# Patient Record
Sex: FEMALE | Race: WHITE | Employment: OTHER | URBAN - METROPOLITAN AREA
[De-identification: names, ages, dates, MRNs, and addresses within clinical notes are randomized per-mention and may not be internally consistent; named-entity substitution may affect disease eponyms.]

---

## 2018-03-22 ENCOUNTER — OFFICE VISIT (OUTPATIENT)
Dept: NEUROLOGY | Age: 83
End: 2018-03-22

## 2018-03-22 VITALS
SYSTOLIC BLOOD PRESSURE: 132 MMHG | HEART RATE: 64 BPM | HEIGHT: 61 IN | WEIGHT: 196 LBS | DIASTOLIC BLOOD PRESSURE: 68 MMHG | OXYGEN SATURATION: 64 % | BODY MASS INDEX: 37 KG/M2 | TEMPERATURE: 98 F | RESPIRATION RATE: 16 BRPM

## 2018-03-22 DIAGNOSIS — R51.9 NEW ONSET OF HEADACHES AFTER AGE 50: Primary | ICD-10-CM

## 2018-03-22 DIAGNOSIS — R51.9 NEW ONSET OF HEADACHES AFTER AGE 50: ICD-10-CM

## 2018-03-22 PROBLEM — E66.01 SEVERE OBESITY (BMI 35.0-39.9) WITH COMORBIDITY (HCC): Status: ACTIVE | Noted: 2018-03-22

## 2018-03-22 RX ORDER — FUROSEMIDE 40 MG/1
TABLET ORAL
Refills: 3 | COMMUNITY
Start: 2018-03-08 | End: 2020-10-20 | Stop reason: DRUGHIGH

## 2018-03-22 RX ORDER — GABAPENTIN 100 MG/1
CAPSULE ORAL
Qty: 90 CAP | Refills: 3 | Status: SHIPPED | OUTPATIENT
Start: 2018-03-22 | End: 2020-10-20

## 2018-03-22 RX ORDER — HYDROCODONE BITARTRATE AND ACETAMINOPHEN 5; 325 MG/1; MG/1
TABLET ORAL
Refills: 0 | COMMUNITY
Start: 2018-03-08 | End: 2020-10-20

## 2018-03-22 RX ORDER — LEVOTHYROXINE SODIUM 88 UG/1
TABLET ORAL
Refills: 1 | COMMUNITY
Start: 2018-01-29 | End: 2020-07-24

## 2018-03-22 RX ORDER — NORTRIPTYLINE HYDROCHLORIDE 10 MG/1
10 CAPSULE ORAL
Qty: 30 CAP | Refills: 6 | Status: SHIPPED | OUTPATIENT
Start: 2018-03-22 | End: 2020-10-20

## 2018-03-22 RX ORDER — TRAZODONE HYDROCHLORIDE 50 MG/1
TABLET ORAL
Refills: 1 | COMMUNITY
Start: 2018-01-19 | End: 2020-10-20

## 2018-03-22 RX ORDER — ATORVASTATIN CALCIUM 20 MG/1
TABLET, FILM COATED ORAL
Refills: 1 | COMMUNITY
Start: 2018-01-19 | End: 2020-07-26 | Stop reason: SDUPTHER

## 2018-03-22 NOTE — MR AVS SNAPSHOT
[address] [address] [address] 
[phone] Patient: Yobani Ramos MRN: [id number] Donal Pac Visit Information Date & Time Provider Department Dept. Phone Encounter #  
 3/22/2018  1:00 PM Kirk Crews MD [facility] [phone] [id number] Follow-up Instructions Return in about 2 months (around 5/22/2018). Your Appointments 5/31/2018  2:20 PM  
Follow Up with Kirk rCews MD  
[address] [facility]) Appt Note: follow up  
 [address] [facility] [address] [address] [address]  
Debbie Escalante [address] [address] [address] Upcoming Health Maintenance Date Due DTaP/Tdap/Td series (1 - Tdap) 8/8/1952 ZOSTER VACCINE AGE 60> 6/8/1991 GLAUCOMA SCREENING Q2Y 8/8/1996 Bone Densitometry (Dexa) Screening 8/8/1996 Pneumococcal 65+ Low/Medium Risk (2 of 2 - PCV13) 1/14/2014 Influenza Age 5 to Adult 8/1/2017 MEDICARE YEARLY EXAM 3/14/2018 Allergies as of 3/22/2018  Review Complete On: 3/22/2018 By: Kirk Crews MD  
 No Known Allergies Current Immunizations  Never Reviewed Name Date Influenza Vaccine PF 1/14/2013  6:56 AM  
 Pneumococcal Polysaccharide (PPSV-23) 1/14/2013  6:52 AM  
  
 Not reviewed this visit You Were Diagnosed With   
  
 Codes Comments New onset of headaches after age 48    -  Primary ICD-10-CM: R46 ICD-9-CM: 714. 0 Vitals BP Pulse Temp Resp Height(growth percentile) Weight(growth percentile) 132/68 (BP 1 Location: Right arm, BP Patient Position: Sitting) 64 98 °F (36.7 °C) (Oral) 16 5' 1\" (1.549 m) 196 lb (88.9 kg) SpO2 BMI OB Status Smoking Status (!) 64% 37.03 kg/m2 Postmenopausal Never Smoker Vitals History BMI and BSA Data  Body Mass Index Body Surface Area  
 37.03 kg/m 2 1.96 m 2  
  
  
 Preferred Pharmacy Pharmacy Name Phone [id number] [address] [facility] [phone] Your Updated Medication List  
  
   
This list is accurate as of 3/22/18  1:39 PM.  Always use your most recent med list.  
  
  
  
  
 aspirin 325 mg tablet Commonly known as:  ASPIRIN Take 325 mg by mouth daily. * atorvastatin 80 mg tablet Commonly known as:  LIPITOR Take 40 mg by mouth daily. No Longer Taking * atorvastatin 20 mg tablet Commonly known as:  LIPITOR  
TAKE 1 TABLET (20 MG) BY ORAL ROUTE ONCE DAILY AT BEDTIME FOR 90 DAYS  
  
 calcium-vitamin D 500-125 mg-unit tablet Commonly known as:  OSCAL Take 1 Tab by mouth two (2) times a day. furosemide 40 mg tablet Commonly known as:  LASIX TAKE 1 TABLET BY ORAL ROUTE DAILY FOR 90 DAYS  
  
 gabapentin 100 mg capsule Commonly known as:  NEURONTIN  
1 po tid prn  Indications: NEUROPATHIC PAIN  
  
 HYDROcodone-acetaminophen 5-325 mg per tablet Commonly known as:  Nico Staff No Longer Taking  
  
 nortriptyline 10 mg capsule Commonly known as:  PAMELOR Take 1 Cap by mouth nightly. oxyCODONE-acetaminophen 5-325 mg per tablet Commonly known as:  PERCOCET Take 1 Tab by mouth every four (4) hours as needed. PRESERVISION LUTEIN PO Take  by mouth. * SYNTHROID 100 mcg tablet Generic drug:  levothyroxine Take 100 mcg by mouth Daily (before breakfast). * levothyroxine 88 mcg tablet Commonly known as:  SYNTHROID  
TAKE 1 TABLET (88 MCG) BY ORAL ROUTE ONCE DAILY FOR 90 DAYS  
  
 traZODone 50 mg tablet Commonly known as:  DESYREL  
TAKE 1-2 TABLETS BY MOUTH AS NEEDED FOR SLEEP FOR 90 DAYS * Notice: This list has 4 medication(s) that are the same as other medications prescribed for you. Read the directions carefully, and ask your doctor or other care provider to review them with you. Prescriptions Sent to Pharmacy Refills nortriptyline (PAMELOR) 10 mg capsule 6 Sig: Take 1 Cap by mouth nightly. Class: Normal  
 Pharmacy: [facility] #[id number] - [place], [address] [facility] Ph #: [phone] Route: Oral  
 gabapentin (NEURONTIN) 100 mg capsule 3 Si po tid prn  Indications: NEUROPATHIC PAIN Class: Normal  
 Pharmacy: [facility] #[id number] - [place], [address] [facility] Ph #: [phone] We Performed the Following C REACTIVE PROTEIN, QT [14229 CPT(R)] Follow-up Instructions Return in about 2 months (around 2018). To-Do List   
 2018 Lab:  SED RATE (ESR)   
  
 2018 Imaging:  MRI BRAIN WO CONT Patient Instructions Patient history reviewed and patient examined. A very different headache story and atypical but exam looks normal otherwise. Would like to check some blood work out and a scan of the head. Will suggest a low-dose preventative drug at night called nortriptyline and need to be patient as a drug builds up in the system. Will issue another drug gabapentin to be used only as needed and she does have a neck pain component. Revisit in the near future. Introducing \[facility]\"" & HEALTH SERVICES! [facility] introduces [employer] patient portal. Now you can access parts of your medical record, email your doctor's office, and request medication refills online. 1. In your internet browser, go to [email or web address] 2. Click on the First Time User? Click Here link in the Sign In box. You will see the New Member Sign Up page. 3. Enter your [employer] Access Code exactly as it appears below. You will not need to use this code after youve completed the sign-up process. If you do not sign up before the expiration date, you must request a new code. · [employer] Access Code: [email or web address] Expires: 2018 12:36 PM 
 
4.  Enter the last four digits of your Social Security Number (xxxx) and Date of Birth (mm/dd/yyyy) as indicated and click Submit. You will be taken to the next sign-up page. 5. Create a [employer]smith ID. This will be your [employer] login ID and cannot be changed, so think of one that is secure and easy to remember. 6. Create a [employer] password. You can change your password at any time. 7. Enter your Password Reset Question and Answer. This can be used at a later time if you forget your password. 8. Enter your e-mail address. You will receive e-mail notification when new information is available in [address] 9. Click Sign Up. You can now view and download portions of your medical record. 10. Click the Download Summary menu link to download a portable copy of your medical information. If you have questions, please visit the Frequently Asked Questions section of the [employer] website. Remember, [employer] is NOT to be used for urgent needs. For medical emergencies, dial 911. Now available from your iPhone and Android! Please provide this summary of care documentation to your next provider. Your primary care clinician is listed as [address]. If you have any questions after today's visit, please call [phone].

## 2018-03-22 NOTE — PROGRESS NOTES
Chief Complaint   Patient presents with    New Patient     Patient presents today as a new patient     Headache     Patient has had a headache since February daily OTC meds are not giving her much relief.     Sinus Pain     Sinus Pain/pressure    Watery Eyes

## 2018-03-22 NOTE — PROGRESS NOTES
Neurology Consult      Subjective:      Aurora Rashid is a 80 y.o. female who comes in with her daughter on first encounter. Apparently had a de michelle headache experience since February that has been continuous since. Says that she really is not a headache person and that includes the majority of her life. Currently describes a migratory sharp pain around her head and has a current bifrontal pressure sensation that is continuous. Occasionally gets blurry vision in the eyes. No recent trauma rash chills or fever. Was treated for possible sinusitis on amoxicillin for 10 days but no real rescue. There is no nausea and vomiting and it is enhanced by nothing. Somewhat diminished with Aleve or Advil but is inconsistent and leaves a lot to be desired. For the last year has had some left ear pressure and itching phenomena. No imaging so far. Denies any stress. Has what sounds like established sleep maintenance insomnia but the daughter points out that sometimes she sleeps while watching TV and does not realize it. No background history of hypertension or stroke history. Has had no localizing attributes of numbness or weakness or involuntary movement or similar preoccupations. Cognition is good. Mood and behavior is baseline. Does have a background neck pain component. Current Outpatient Prescriptions   Medication Sig Dispense Refill    levothyroxine (SYNTHROID) 88 mcg tablet TAKE 1 TABLET (88 MCG) BY ORAL ROUTE ONCE DAILY FOR 90 DAYS  1    furosemide (LASIX) 40 mg tablet TAKE 1 TABLET BY ORAL ROUTE DAILY FOR 90 DAYS  3    traZODone (DESYREL) 50 mg tablet TAKE 1-2 TABLETS BY MOUTH AS NEEDED FOR SLEEP FOR 90 DAYS  1    atorvastatin (LIPITOR) 20 mg tablet TAKE 1 TABLET (20 MG) BY ORAL ROUTE ONCE DAILY AT BEDTIME FOR 90 DAYS  1    VIT C/VIT E AC/LUT/COPPER/ZINC (PRESERVISION LUTEIN PO) Take  by mouth.  nortriptyline (PAMELOR) 10 mg capsule Take 1 Cap by mouth nightly.  30 Cap 6    gabapentin (NEURONTIN) 100 mg capsule 1 po tid prn  Indications: NEUROPATHIC PAIN 90 Cap 3    calcium-vitamin D (OSCAL) 500-125 mg-unit tablet Take 1 Tab by mouth two (2) times a day.  aspirin (ASPIRIN) 325 mg tablet Take 325 mg by mouth daily.  HYDROcodone-acetaminophen (NORCO) 5-325 mg per tablet No Longer Taking  0    oxyCODONE-acetaminophen (PERCOCET) 5-325 mg per tablet Take 1 Tab by mouth every four (4) hours as needed. 10 Tab 0    atorvastatin (LIPITOR) 80 mg tablet Take 40 mg by mouth daily. No Longer Taking      levothyroxine (SYNTHROID) 100 mcg tablet Take 100 mcg by mouth Daily (before breakfast). No Known Allergies  Past Medical History:   Diagnosis Date    Arthritis     Headache     Hearing loss     Hyperlipidemia     Joint pain     Thyroid disease       Past Surgical History:   Procedure Laterality Date    HX ORTHOPAEDIC      knee replacment 1n 1986 & Feb 2015      Social History     Social History    Marital status:      Spouse name: N/A    Number of children: N/A    Years of education: N/A     Occupational History    Not on file. Social History Main Topics    Smoking status: Never Smoker    Smokeless tobacco: Never Used    Alcohol use No    Drug use: No    Sexual activity: Not on file     Other Topics Concern    Not on file     Social History Narrative      History reviewed. No pertinent family history. Visit Vitals    /68 (BP 1 Location: Right arm, BP Patient Position: Sitting)    Pulse 64    Temp 98 °F (36.7 °C) (Oral)    Resp 16    Ht 5' 1\" (1.549 m)    Wt 88.9 kg (196 lb)    SpO2 (!) 64%    BMI 37.03 kg/m2        Review of Systems:   A comprehensive review of systems was negative except for that written in the HPI. Neuro Exam:     Appearance: The patient is well developed, well nourished, provides a coherent history and is in no acute distress. Mental Status: Oriented to time, place and person.  Mood and affect appropriate. Cranial Nerves:   Intact visual fields. Fundi are benign. JENNIFER, EOM's full, no nystagmus, no ptosis. Facial sensation is normal. Corneal reflexes are intact. Facial movement is symmetric. Hearing is normal bilaterally. Palate is midline with normal sternocleidomastoid and trapezius muscles are normal. Tongue is midline. Motor:  5/5 strength in upper and lower proximal and distal muscles. Normal bulk and tone. No fasciculations. Reflexes:   Deep tendon reflexes 2+/4 and symmetrical.   Sensory:   Normal to touch, pinprick and vibration. Gait:  Normal gait. Tremor:   No tremor noted. Cerebellar:  No cerebellar signs present. Neurovascular:  Normal heart sounds and regular rhythm, peripheral pulses intact, and no carotid bruits. Palpation of superficial temporal arteries was objectively normal and subjectively nontender. Assessment:   Atypical headache as described. Would like to suggest some investigation that includes labs for sed rate and CRP although I tend to doubt seriously this is temporal arteritis. Would also like to get a baseline scan of the head with MRI without contrast.  Put nortriptyline 10 mg nightly in place as a preventative drug and use gabapentin 100 mg 3 times daily as needed. Does not have the usual and customary attributes of a migraine and really there is no tension type headache story here unless I missed it? Does have a neck pain component. Plan:   Revisit in about 2 months.   Signed by :  Nico Yo MD

## 2018-03-22 NOTE — PATIENT INSTRUCTIONS
Patient history reviewed and patient examined. A very different headache story and atypical but exam looks normal otherwise. Would like to check some blood work out and a scan of the head. Will suggest a low-dose preventative drug at night called nortriptyline and need to be patient as a drug builds up in the system. Will issue another drug gabapentin to be used only as needed and she does have a neck pain component. Revisit in the near future.

## 2018-03-23 LAB
CRP SERPL-MCNC: 0.7 MG/L (ref 0–4.9)
ERYTHROCYTE [SEDIMENTATION RATE] IN BLOOD BY WESTERGREN METHOD: 11 MM/HR (ref 0–40)

## 2018-03-31 ENCOUNTER — HOSPITAL ENCOUNTER (OUTPATIENT)
Dept: MRI IMAGING | Age: 83
Discharge: HOME | End: 2018-03-31
Attending: SPECIALIST
Payer: MEDICARE

## 2018-03-31 DIAGNOSIS — R51.9 NEW ONSET OF HEADACHES AFTER AGE 50: ICD-10-CM

## 2018-03-31 PROCEDURE — 70551 MRI BRAIN STEM W/O DYE: CPT

## 2018-04-06 ENCOUNTER — TELEPHONE (OUTPATIENT)
Dept: NEUROLOGY | Age: 83
End: 2018-04-06

## 2018-04-06 NOTE — TELEPHONE ENCOUNTER
----- Message from Ana Reyes sent at 4/6/2018  3:18 PM EDT -----  Regarding: Dr. Wilkins Dear  The patient's daughter Nhan Betancourt missed a call from Wisam Galicia and is requesting a call back.  [phone])[phone]

## 2020-07-21 DIAGNOSIS — M54.32 SCIATICA, LEFT SIDE: ICD-10-CM

## 2020-07-22 DIAGNOSIS — E03.9 HYPOTHYROIDISM, UNSPECIFIED TYPE: Primary | ICD-10-CM

## 2020-07-22 DIAGNOSIS — E78.5 HYPERLIPIDEMIA LDL GOAL <100: ICD-10-CM

## 2020-07-24 RX ORDER — LEVOTHYROXINE SODIUM 88 UG/1
TABLET ORAL
Qty: 30 TAB | Refills: 0 | Status: SHIPPED | OUTPATIENT
Start: 2020-07-24 | End: 2020-07-26 | Stop reason: SDUPTHER

## 2020-07-24 NOTE — TELEPHONE ENCOUNTER
Deana bowens needs her, Levthyroxine and Atorvastatin Filled. She said she is 80 yrs. And does not want to come out.

## 2020-07-25 RX ORDER — METHOCARBAMOL 750 MG/1
TABLET, FILM COATED ORAL
Qty: 60 TAB | Refills: 1 | Status: SHIPPED | OUTPATIENT
Start: 2020-07-25 | End: 2020-10-20

## 2020-07-26 RX ORDER — LEVOTHYROXINE SODIUM 88 UG/1
88 TABLET ORAL DAILY
Qty: 30 TAB | Refills: 0 | Status: SHIPPED | OUTPATIENT
Start: 2020-07-26 | End: 2020-09-14

## 2020-07-26 RX ORDER — ATORVASTATIN CALCIUM 20 MG/1
20 TABLET, FILM COATED ORAL EVERY EVENING
Qty: 30 TAB | Refills: 0 | Status: SHIPPED | OUTPATIENT
Start: 2020-07-26 | End: 2020-10-20 | Stop reason: SDUPTHER

## 2020-07-27 NOTE — TELEPHONE ENCOUNTER
Pts last chronic OV 11/2019/  Its been 8 months since last OV. It is time for a OV, it can be virtual again ( she had a since visit in May virtually) . She will need  Labs. /   Refills done 30 day supply

## 2020-08-16 DIAGNOSIS — S76.012A STRAIN OF MUSCLE, FASCIA AND TENDON OF LEFT HIP, INITIAL ENCOUNTER: ICD-10-CM

## 2020-08-16 RX ORDER — DICLOFENAC SODIUM 10 MG/G
GEL TOPICAL
Qty: 200 G | Refills: 2 | Status: SHIPPED | OUTPATIENT
Start: 2020-08-16 | End: 2020-11-10

## 2020-10-10 DIAGNOSIS — E03.9 HYPOTHYROIDISM, UNSPECIFIED TYPE: ICD-10-CM

## 2020-10-10 RX ORDER — LEVOTHYROXINE SODIUM 88 UG/1
88 TABLET ORAL DAILY
Qty: 30 TAB | Refills: 0 | Status: SHIPPED | OUTPATIENT
Start: 2020-10-10 | End: 2020-10-20 | Stop reason: SDUPTHER

## 2020-10-20 ENCOUNTER — OFFICE VISIT (OUTPATIENT)
Dept: PRIMARY CARE CLINIC | Age: 85
End: 2020-10-20
Payer: MEDICARE

## 2020-10-20 VITALS
HEART RATE: 63 BPM | DIASTOLIC BLOOD PRESSURE: 64 MMHG | TEMPERATURE: 97.7 F | HEIGHT: 62 IN | WEIGHT: 196.2 LBS | SYSTOLIC BLOOD PRESSURE: 124 MMHG | OXYGEN SATURATION: 96 % | BODY MASS INDEX: 36.1 KG/M2

## 2020-10-20 DIAGNOSIS — R23.2 HOT FLASHES NOT DUE TO MENOPAUSE: Chronic | ICD-10-CM

## 2020-10-20 DIAGNOSIS — E03.9 HYPOTHYROIDISM, UNSPECIFIED TYPE: ICD-10-CM

## 2020-10-20 DIAGNOSIS — E66.01 SEVERE OBESITY (BMI 35.0-39.9) WITH COMORBIDITY (HCC): ICD-10-CM

## 2020-10-20 DIAGNOSIS — K64.9 HEMORRHOIDS, UNSPECIFIED HEMORRHOID TYPE: Chronic | ICD-10-CM

## 2020-10-20 DIAGNOSIS — E78.5 HYPERLIPIDEMIA LDL GOAL <100: ICD-10-CM

## 2020-10-20 DIAGNOSIS — I44.0 AV BLOCK, 1ST DEGREE: Primary | ICD-10-CM

## 2020-10-20 DIAGNOSIS — R60.0 LOCALIZED EDEMA: Chronic | ICD-10-CM

## 2020-10-20 DIAGNOSIS — R00.2 PALPITATIONS: ICD-10-CM

## 2020-10-20 DIAGNOSIS — S76.012A STRAIN OF MUSCLE, FASCIA AND TENDON OF LEFT HIP, INITIAL ENCOUNTER: Chronic | ICD-10-CM

## 2020-10-20 LAB
BILIRUB UR QL STRIP: NEGATIVE
GLUCOSE UR-MCNC: NEGATIVE MG/DL
KETONES P FAST UR STRIP-MCNC: NEGATIVE MG/DL
PH UR STRIP: 7 [PH] (ref 4.6–8)
PROT UR QL STRIP: NEGATIVE
SP GR UR STRIP: 1.01 (ref 1–1.03)
UA UROBILINOGEN AMB POC: NORMAL (ref 0.2–1)
URINALYSIS CLARITY POC: CLEAR
URINALYSIS COLOR POC: YELLOW
URINE BLOOD POC: NEGATIVE
URINE LEUKOCYTES POC: NEGATIVE
URINE NITRITES POC: NEGATIVE

## 2020-10-20 PROCEDURE — 99214 OFFICE O/P EST MOD 30 MIN: CPT | Performed by: FAMILY MEDICINE

## 2020-10-20 PROCEDURE — 81003 URINALYSIS AUTO W/O SCOPE: CPT | Performed by: FAMILY MEDICINE

## 2020-10-20 RX ORDER — FUROSEMIDE 20 MG/1
TABLET ORAL
Qty: 180 TAB | Refills: 1 | Status: SHIPPED | OUTPATIENT
Start: 2020-10-20 | End: 2020-11-18

## 2020-10-20 RX ORDER — DICLOFENAC SODIUM 10 MG/G
GEL TOPICAL
Qty: 200 G | Refills: 2 | Status: CANCELLED | OUTPATIENT
Start: 2020-10-20

## 2020-10-20 RX ORDER — FUROSEMIDE 20 MG/1
20 TABLET ORAL DAILY
COMMUNITY
End: 2020-10-20 | Stop reason: SDUPTHER

## 2020-10-20 RX ORDER — ATORVASTATIN CALCIUM 20 MG/1
20 TABLET, FILM COATED ORAL EVERY EVENING
Qty: 90 TAB | Refills: 1 | Status: SHIPPED | OUTPATIENT
Start: 2020-10-20 | End: 2021-02-16 | Stop reason: SDUPTHER

## 2020-10-20 RX ORDER — LEVOTHYROXINE SODIUM 88 UG/1
88 TABLET ORAL DAILY
Qty: 90 TAB | Refills: 1 | Status: SHIPPED | OUTPATIENT
Start: 2020-10-20 | End: 2021-02-16 | Stop reason: SDUPTHER

## 2020-10-20 RX ORDER — HYDROCORTISONE 25 MG/G
CREAM TOPICAL 4 TIMES DAILY
Qty: 30 G | Refills: 1 | Status: SHIPPED | OUTPATIENT
Start: 2020-10-20 | End: 2020-11-10

## 2020-10-20 NOTE — PROGRESS NOTES
HISTORY OF PRESENT ILLNESS  Hyperlipidemia:    On atorvastatin 20 mg without incident. No concerns about medication      Hypothyroid :  stable on levothyroxine 88. Legs swollen a lot lately. Has bene on feet a lot. Wake up in am gets tachycardia and not in day. 2 months  And getting often she says. appetite is fine. No vaginal bleed. some left hip leg pain. Doesn't know why. Been days. No trauma recalled. Hemorrhoids acting up to. Bladder prolapse but if takes extra fluid pills for the leg edema and takes 2 sometiems. She says that's fine. Rajendra Gutiérrez is a 80 y.o. female. 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    AV block, 1st degree     Back problem     Constipation 11/18/2020    Edema of lower extremity 11/18/2020    Headache     Hearing loss     Herpes     Hyperlipidemia     Hypertension     Incomplete rectal prolapse 11/18/2020    Joint pain     Osteoarthritis of both knees 11/18/2020    Sleep disorder     Insomnia    Thyroid disease      Social History     Tobacco Use    Smoking status: Never Smoker    Smokeless tobacco: Never Used   Substance Use Topics    Alcohol use: No    Drug use: No       Family History   Problem Relation Age of Onset    Heart Disease Mother     Heart Disease Father        Review of Systems   Constitutional: Positive for malaise/fatigue. Negative for chills, fever and weight loss. HENT: Negative for congestion and sore throat. Eyes: Negative for blurred vision and pain. Respiratory: Negative for cough, sputum production and shortness of breath. Cardiovascular: Positive for leg swelling. Negative for chest pain, palpitations, orthopnea, claudication and PND. Gastrointestinal: Negative for abdominal pain, blood in stool, constipation, diarrhea and nausea. Genitourinary: Negative for dysuria and frequency. Musculoskeletal: Positive for joint pain.  Negative for falls.   Skin: Negative for rash. Neurological: Negative for dizziness, tremors, speech change, focal weakness, loss of consciousness and headaches. Endo/Heme/Allergies: Negative for polydipsia. Psychiatric/Behavioral: Negative for depression. The patient is not nervous/anxious. Visit Vitals  /64 (BP 1 Location: Right arm, BP Patient Position: At rest)   Pulse 63   Temp 97.7 °F (36.5 °C) (Temporal)   Ht 5' 2\" (1.575 m)   Wt 196 lb 3.2 oz (89 kg)   SpO2 96%   BMI 35.89 kg/m²         Physical Exam  Vitals signs and nursing note reviewed. Exam conducted with a chaperone present. Constitutional:       General: She is not in acute distress. Appearance: Normal appearance. She is obese. She is not ill-appearing. HENT:      Head: Normocephalic and atraumatic. Right Ear: Tympanic membrane normal.      Left Ear: Tympanic membrane normal.      Mouth/Throat:      Mouth: Mucous membranes are moist.      Pharynx: Posterior oropharyngeal erythema present. No oropharyngeal exudate. Eyes:      General: No scleral icterus. Right eye: No discharge. Left eye: No discharge. Extraocular Movements: Extraocular movements intact. Conjunctiva/sclera: Conjunctivae normal.      Pupils: Pupils are equal, round, and reactive to light. Cardiovascular:      Rate and Rhythm: Normal rate and regular rhythm. Pulses: Normal pulses. Heart sounds: Normal heart sounds. Pulmonary:      Effort: Pulmonary effort is normal. No respiratory distress. Breath sounds: Normal breath sounds. No wheezing or rales. Musculoskeletal:         General: Tenderness (left ant hip) present. Right lower leg: Edema present. Left lower leg: Edema present. Lymphadenopathy:      Cervical: No cervical adenopathy. Skin:     General: Skin is warm. Capillary Refill: Capillary refill takes less than 2 seconds. Coloration: Skin is not jaundiced. Findings: No erythema or rash. Neurological:      General: No focal deficit present. Mental Status: She is alert and oriented to person, place, and time. Mental status is at baseline. Cranial Nerves: No cranial nerve deficit. Deep Tendon Reflexes: Reflexes normal.   Psychiatric:         Attention and Perception: She is inattentive. Mood and Affect: Mood is anxious. Speech: Speech is tangential.         Thought Content: Thought content normal.         Cognition and Memory: Cognition normal.         Judgment: Judgment is impulsive. Comments: Monologue. Makes it hard to disseminate plan           ASSESSMENT and PLAN  Diagnoses and all orders for this visit:    1. AV block, 1st degree  -     AMB POC EKG ROUTINE W/ 12 LEADS, INTER & REP  -     MAGNESIUM    2. Strain of muscle, fascia and tendon of left hip, initial encounter  Comments:  Ice and stretch. Could be hip arthritis,    3. Hypothyroidism, unspecified type  -     levothyroxine (SYNTHROID) 88 mcg tablet; Take 1 Tab by mouth daily. Indications: a condition with low thyroid hormone levels  -     TSH 3RD GENERATION  -     T4, FREE    4. Hyperlipidemia LDL goal <100  -     atorvastatin (LIPITOR) 20 mg tablet; Take 1 Tab by mouth every evening. Indications: high cholesterol and high triglycerides  -     METABOLIC PANEL, COMPREHENSIVE  -     LIPID PANEL  -     AMB POC EKG ROUTINE W/ 12 LEADS, INTER & REP    5. Localized edema  Comments:  chronic    6. Hemorrhoids, unspecified hemorrhoid type  Comments:  Add cream, sitz bath, salty. . See GI if persists  Orders:  -     CBC WITH AUTOMATED DIFF    7. Hot flashes not due to menopause  Comments:  I do not know what to make of it . We will check thyroid. Nonspecific history  Orders:  -     AMB POC URINALYSIS DIP STICK AUTO W/O MICRO  -     CBC WITH AUTOMATED DIFF    8. Severe obesity (BMI 35.0-39. 9) with comorbidity ([address]    9. Palpitations  -     MAGNESIUM         Orders Placed This Encounter    METABOLIC PANEL, COMPREHENSIVE    TSH 3RD GENERATION    T4, FREE    LIPID PANEL    CBC WITH AUTOMATED DIFF    MAGNESIUM    AMB POC URINALYSIS DIP STICK AUTO W/O MICRO    AMB POC EKG ROUTINE W/ 12 LEADS, INTER & REP    DISCONTD: furosemide (LASIX) 20 mg tablet    levothyroxine (SYNTHROID) 88 mcg tablet    DISCONTD: furosemide (LASIX) 20 mg tablet    atorvastatin (LIPITOR) 20 mg tablet    DISCONTD: hydrocortisone (Proctosol HC) 2.5 % rectal cream     Follow-up and Dispositions    · Return in about 3 months (around 1/20/2021), or if symptoms worsen or fail to improve, for Chronic office visit.

## 2020-10-21 LAB
ALBUMIN SERPL-MCNC: 4.1 G/DL (ref 3.6–4.6)
ALBUMIN/GLOB SERPL: 1.4 {RATIO} (ref 1.2–2.2)
ALP SERPL-CCNC: 70 IU/L (ref 39–117)
ALT SERPL-CCNC: 12 IU/L (ref 0–32)
AST SERPL-CCNC: 19 IU/L (ref 0–40)
BASOPHILS # BLD AUTO: 0.1 X10E3/UL (ref 0–0.2)
BASOPHILS NFR BLD AUTO: 1 %
BILIRUB SERPL-MCNC: 0.5 MG/DL (ref 0–1.2)
BUN SERPL-MCNC: 24 MG/DL (ref 8–27)
BUN/CREAT SERPL: 16 (ref 12–28)
CALCIUM SERPL-MCNC: 9 MG/DL (ref 8.7–10.3)
CHLORIDE SERPL-SCNC: 101 MMOL/L (ref 96–106)
CHOLEST SERPL-MCNC: 164 MG/DL (ref 100–199)
CO2 SERPL-SCNC: 26 MMOL/L (ref 20–29)
CREAT SERPL-MCNC: 1.53 MG/DL (ref 0.57–1)
EOSINOPHIL # BLD AUTO: 0.2 X10E3/UL (ref 0–0.4)
EOSINOPHIL NFR BLD AUTO: 4 %
ERYTHROCYTE [DISTWIDTH] IN BLOOD BY AUTOMATED COUNT: 12.3 % (ref 11.7–15.4)
GLOBULIN SER CALC-MCNC: 3 G/DL (ref 1.5–4.5)
GLUCOSE SERPL-MCNC: 100 MG/DL (ref 65–99)
HCT VFR BLD AUTO: 35.6 % (ref 34–46.6)
HDLC SERPL-MCNC: 43 MG/DL
HGB BLD-MCNC: 11.9 G/DL (ref 11.1–15.9)
IMM GRANULOCYTES # BLD AUTO: 0 X10E3/UL (ref 0–0.1)
IMM GRANULOCYTES NFR BLD AUTO: 0 %
LDLC SERPL CALC-MCNC: 87 MG/DL (ref 0–99)
LYMPHOCYTES # BLD AUTO: 1.4 X10E3/UL (ref 0.7–3.1)
LYMPHOCYTES NFR BLD AUTO: 26 %
MAGNESIUM SERPL-MCNC: 2.4 MG/DL (ref 1.6–2.3)
MCH RBC QN AUTO: 30.5 PG (ref 26.6–33)
MCHC RBC AUTO-ENTMCNC: 33.4 G/DL (ref 31.5–35.7)
MCV RBC AUTO: 91 FL (ref 79–97)
MONOCYTES # BLD AUTO: 0.5 X10E3/UL (ref 0.1–0.9)
MONOCYTES NFR BLD AUTO: 10 %
NEUTROPHILS # BLD AUTO: 3.2 X10E3/UL (ref 1.4–7)
NEUTROPHILS NFR BLD AUTO: 59 %
PLATELET # BLD AUTO: 233 X10E3/UL (ref 150–450)
POTASSIUM SERPL-SCNC: 4.2 MMOL/L (ref 3.5–5.2)
PROT SERPL-MCNC: 7.1 G/DL (ref 6–8.5)
RBC # BLD AUTO: 3.9 X10E6/UL (ref 3.77–5.28)
SODIUM SERPL-SCNC: 142 MMOL/L (ref 134–144)
T4 FREE SERPL-MCNC: 1.69 NG/DL (ref 0.82–1.77)
TRIGL SERPL-MCNC: 201 MG/DL (ref 0–149)
TSH SERPL DL<=0.005 MIU/L-ACNC: 0.19 UIU/ML (ref 0.45–4.5)
VLDLC SERPL CALC-MCNC: 34 MG/DL (ref 5–40)
WBC # BLD AUTO: 5.4 X10E3/UL (ref 3.4–10.8)

## 2020-10-29 ENCOUNTER — TELEPHONE (OUTPATIENT)
Dept: PRIMARY CARE CLINIC | Age: 85
End: 2020-10-29

## 2020-10-29 DIAGNOSIS — I12.9 BENIGN HYPERTENSIVE CKD, STAGE 1-4 OR UNSPECIFIED CHRONIC KIDNEY DISEASE: Primary | ICD-10-CM

## 2020-10-29 RX ORDER — CEPHALEXIN 500 MG/1
500 CAPSULE ORAL 4 TIMES DAILY
Qty: 40 CAP | Refills: 0 | Status: SHIPPED | OUTPATIENT
Start: 2020-10-29 | End: 2020-11-08

## 2020-10-29 NOTE — TELEPHONE ENCOUNTER
Grand daughter Angelia Wharton) called about the swelling in her feet. The rt.  foot is very red, tight, and  Top of foot is  Super swollen starting to peel hurts to the touch. Back of leg feels very bumpy. Pls. Call ASAP, not rogers what to do. Yolanda Abarca Yolanda Abarca Wants to bring her here t see the doctor. [phone], number to call (from Quinn Cintron 0865)    I called and spoke to patient granddaughter. Pt was just seen a few days ago. And her foot was swollen then. Pt states she talk to you about it but I don't see anything about it in the note. Does her labs show any reason for the foot swelling. Pt states foot is swelling, painful to touch, red, not hot to touch, not SOB. She feels fine everywhere else. Its just her foot. I did give her a little review of the results and notice the gfr,  Pt grand daughter states she has no previous concerns of kidney. She does wear a depends though.

## 2020-10-29 NOTE — TELEPHONE ENCOUNTER
Maria L Beal called requesting Lab results and she needs a refill on her Levothyroxine 90 day refill.

## 2020-10-29 NOTE — TELEPHONE ENCOUNTER
Grand daughter Simba Magana) called about the swelling in her feet. The rt.  foot is very red, tight, and  Top of foot is  Super swollen starting to peel hurts to the touch. Back of leg feels very bumpy. Pls. Call ASAP, not rogers what to do. Tory Fitzpatrick Wants to bring her here t see the doctor.  [phone], number to call

## 2020-10-29 NOTE — TELEPHONE ENCOUNTER
Yes .  I sent keflex. Go to ER if not improving. Normal  blood count, normal liver>  Normal  thyroid function, MG.     decline in renal function. Refer to nephrology.

## 2020-11-10 ENCOUNTER — VIRTUAL VISIT (OUTPATIENT)
Dept: PRIMARY CARE CLINIC | Age: 85
End: 2020-11-10
Payer: MEDICARE

## 2020-11-10 DIAGNOSIS — L03.115 CELLULITIS OF RIGHT LOWER EXTREMITY: Primary | ICD-10-CM

## 2020-11-10 DIAGNOSIS — N18.32 STAGE 3B CHRONIC KIDNEY DISEASE (HCC): ICD-10-CM

## 2020-11-10 PROCEDURE — 99213 OFFICE O/P EST LOW 20 MIN: CPT | Performed by: NURSE PRACTITIONER

## 2020-11-10 RX ORDER — CEPHALEXIN 500 MG/1
500 CAPSULE ORAL 2 TIMES DAILY
Qty: 20 CAP | Refills: 0 | Status: SHIPPED | OUTPATIENT
Start: 2020-11-10 | End: 2020-11-18

## 2020-11-10 RX ORDER — FUROSEMIDE 40 MG/1
TABLET ORAL
Qty: 5 TAB | Refills: 0 | Status: SHIPPED | OUTPATIENT
Start: 2020-11-10 | End: 2021-02-16 | Stop reason: SDUPTHER

## 2020-11-10 NOTE — PATIENT INSTRUCTIONS

## 2020-11-10 NOTE — PROGRESS NOTES
HISTORY OF PRESENT ILLNESS  Giovanna Oakes is a 80 y.o. female presents via telemedicine for swelling in both feet; pain in right foot and redness/dark spot on foot  Denies fever, denies pain under any particular toe. History of having poor kidney function last lab work (eGFR 30})     There were no vitals filed for this visit. Patient Active Problem List   Diagnosis Code    Cellulitis and abscess of leg L03.119, L02.419    Right leg pain M79.604    Hyperlipidemia E78.5    Hypothyroidism E03.9    Severe obesity (BMI 35.0-39. 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 9) with comorbidity ([facility] [address] 03/22/2018    Cellulitis and abscess of leg 01/14/2013    Right leg pain 01/14/2013    Hyperlipidemia 01/14/2013    Hypothyroidism 01/14/2013     Current Outpatient Medications   Medication Sig Dispense Refill    levothyroxine (SYNTHROID) 88 mcg tablet Take 1 Tab by mouth daily. Indications: a condition with low thyroid hormone levels 90 Tab 1    furosemide (LASIX) 20 mg tablet Take 1-2 tablet by mouth in the morning as needed 180 Tab 1    atorvastatin (LIPITOR) 20 mg tablet Take 1 Tab by mouth every evening.  Indications: high cholesterol and high triglycerides 90 Tab 1     No Known Allergies  Past Medical History:   Diagnosis Date    Arthritis     AV block, 1st degree     Back problem     Headache     Hearing loss     Herpes     Hyperlipidemia     Hypertension     Joint pain     Thyroid disease      Past Surgical History:   Procedure Laterality Date    HX ORTHOPAEDIC      knee replacment 1n 1986 & Feb 2015     Family History   Problem Relation Age of Onset    Heart Disease Mother     Heart Disease Father      Social History     Tobacco Use    Smoking status: Never Smoker    Smokeless tobacco: Never Used   Substance Use Topics    Alcohol use: No           Review of Systems   Constitutional: Negative for chills and fever. Respiratory: Negative for cough and shortness of breath. Cardiovascular: Positive for leg swelling. Negative for chest pain and palpitations. Gastrointestinal: Negative for constipation and diarrhea. Neurological: Negative for dizziness. Psychiatric/Behavioral: Negative for depression. The patient is not nervous/anxious and does not have insomnia. Physical Exam  Constitutional:       Appearance: Normal appearance. She is obese. Comments: As seen on video chat   HENT:      Head: Normocephalic and atraumatic. Comments: As seen on video chat     Nose: Nose normal.      Comments: As seen on video chat  Eyes:      Extraocular Movements: Extraocular movements intact. Comments: As seen on video chat   Neck:      Musculoskeletal: Normal range of motion. Comments: As seen on video chat  Pulmonary:      Effort: Pulmonary effort is normal.   Musculoskeletal:      Right lower leg: Edema present. Left lower leg: Edema present. Neurological:      General: No focal deficit present. Mental Status: She is alert and oriented to person, place, and time. Comments: As seen on video chat   Psychiatric:         Mood and Affect: Mood normal.         Behavior: Behavior normal.         Thought Content: Thought content normal.         Judgment: Judgment normal.           ASSESSMENT and PLAN  Diagnoses and all orders for this visit:    1. Cellulitis of right lower extremity  -     furosemide (LASIX) 40 mg tablet; Please take instead of daily lasix. -     cephALEXin (KEFLEX) 500 mg capsule; Take 1 Cap by mouth two (2) times a day for 10 days.   Difficult to see on video and likely the CKD is contributing to the overall picture of swelling. I will send in another referral..     Explained to patient and family that this is a difficult diagnosis and plan due to the video limitations. They voiced understanding  2. Stage 3b chronic kidney disease  -     REFERRAL TO NEPHROLOGY  -     furosemide (LASIX) 40 mg tablet; Please take instead of daily lasix. another referral using my notes should be able to get through     Angelitakeith who was evaluated through a synchronous (real-time) audio-video encounter, and/or his healthcare decision maker, is aware that it is a billable service, with coverage as determined by his insurance carrier. He provided verbal consent to proceed: Yes, and patient identification was verified. It was conducted pursuant to the emergency declaration under the [address] [place] [address], [address] [place] [address] authority and the [employer] General Act. A caregiver was present when appropriate. Ability to conduct physical exam was limited. I was at home. The patient was at home.           Mikala España NP

## 2020-11-18 ENCOUNTER — OFFICE VISIT (OUTPATIENT)
Dept: PRIMARY CARE CLINIC | Age: 85
End: 2020-11-18
Payer: MEDICARE

## 2020-11-18 VITALS
TEMPERATURE: 97 F | HEIGHT: 61 IN | RESPIRATION RATE: 20 BRPM | BODY MASS INDEX: 37 KG/M2 | DIASTOLIC BLOOD PRESSURE: 56 MMHG | OXYGEN SATURATION: 96 % | WEIGHT: 196 LBS | SYSTOLIC BLOOD PRESSURE: 134 MMHG | HEART RATE: 68 BPM

## 2020-11-18 DIAGNOSIS — L03.012 PARONYCHIA OF FINGER OF LEFT HAND: ICD-10-CM

## 2020-11-18 DIAGNOSIS — L03.119 CELLULITIS AND ABSCESS OF LEG: Primary | ICD-10-CM

## 2020-11-18 DIAGNOSIS — L02.419 CELLULITIS AND ABSCESS OF LEG: Primary | ICD-10-CM

## 2020-11-18 PROBLEM — M17.0 OSTEOARTHRITIS OF BOTH KNEES: Status: ACTIVE | Noted: 2020-11-18

## 2020-11-18 PROBLEM — K59.00 CONSTIPATION: Status: ACTIVE | Noted: 2020-11-18

## 2020-11-18 PROBLEM — E11.9 TYPE II DIABETES MELLITUS (HCC): Status: ACTIVE | Noted: 2020-11-18

## 2020-11-18 PROBLEM — K62.3 INCOMPLETE RECTAL PROLAPSE: Status: ACTIVE | Noted: 2020-11-18

## 2020-11-18 PROBLEM — R60.0 EDEMA OF LOWER EXTREMITY: Status: ACTIVE | Noted: 2020-11-18

## 2020-11-18 PROBLEM — I44.30 ATRIOVENTRICULAR BLOCK: Status: ACTIVE | Noted: 2020-11-18

## 2020-11-18 PROCEDURE — 99213 OFFICE O/P EST LOW 20 MIN: CPT | Performed by: NURSE PRACTITIONER

## 2020-11-18 PROCEDURE — 10060 I&D ABSCESS SIMPLE/SINGLE: CPT | Performed by: NURSE PRACTITIONER

## 2020-11-18 RX ORDER — CLINDAMYCIN HYDROCHLORIDE 300 MG/1
300 CAPSULE ORAL 3 TIMES DAILY
Qty: 30 CAP | Refills: 0 | Status: SHIPPED | OUTPATIENT
Start: 2020-11-18 | End: 2020-11-28

## 2020-11-18 NOTE — ASSESSMENT & PLAN NOTE
Area cleaned with alcohol then 22 gauge need inserted parallel into infected area of the cuticle. About 0.5 ml of pus was expressed. Bacitracin and bandage applied to finger. Patient should use warm soaks on finger. Call if the infection is getting worse.

## 2020-11-18 NOTE — PROGRESS NOTES
HISTORY OF PRESENT ILLNESS  Anisa Barba is a 80 y.o. female presents for foot pain. Patient reported that in the middle on October she developed cellulitis in her right foot. Patient reported she was see by Dr. Neva Fernandez on 10/20 and started on 10 days of keflex. Patient notes some improvement in the swelling/redness to her foot. Patient had a lump to top of foot that was very hard and sore still so she did a virtual with Cristiano on 11/10 and was started on another 10 days of keflex. Patient reported that she has one more day of antibiotics but still has some redness and soreness to the top of her foot. She also has been using lasix for her leg swelling. Has appointment with Dr. Mimi Lane on 11/24 for GFR of 30. Patient also c/o redness, pain and swelling to 3rd digit on left hand. Patient has not tried any treatments for this. Notes she had a hang nail. Vitals:    11/18/20 0917   BP: (!) 134/56   Pulse: 68   Resp: 20   Temp: 97 °F (36.1 °C)   TempSrc: Temporal   SpO2: 96%   Weight: 196 lb (88.9 kg)   Height: 5' 1\" (1.549 m)     Patient Active Problem List   Diagnosis Code    Cellulitis and abscess of leg L03.119, L02.419    Right leg pain M79.604    Hyperlipidemia E78.5    Hypothyroidism E03.9    Severe obesity (BMI 35.0-39. 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.9    Cellulitis of right lower extremity L03.115    Stage 3b chronic kidney disease N18.32    Constipation K59.00    Type II diabetes mellitus (HCC) E11.9    Edema of lower extremity R60.0    Atrioventricular block I44.30    Incomplete rectal prolapse K62.3    Osteoarthritis of both knees M17.0    Paronychia of finger of left hand L03.012     Patient Active Problem List    Diagnosis Date Noted    Constipation 11/18/2020    Type II diabetes mellitus ([facility] 11/18/2020    Edema of lower extremity 11/18/2020    Atrioventricular block 11/18/2020    Incomplete rectal prolapse 11/18/2020    Osteoarthritis of both knees 11/18/2020    Paronychia of finger of left hand 11/18/2020    Cellulitis of right lower extremity 11/10/2020    Stage 3b chronic kidney disease 11/10/2020    Benign hypertensive CKD, stage 1-4 or unspecified chronic kidney disease 10/29/2020    Back problem     AV block, 1st degree     Hypertension     Herpes     Severe obesity (BMI 35.0-39. 9) with comorbidity ([facility] 03/22/2018    Cellulitis and abscess of leg 01/14/2013    Right leg pain 01/14/2013    Hyperlipidemia 01/14/2013    Hypothyroidism 01/14/2013     Current Outpatient Medications   Medication Sig Dispense Refill    clindamycin (CLEOCIN) 300 mg capsule Take 1 Cap by mouth three (3) times daily for 10 days. 30 Cap 0    furosemide (LASIX) 40 mg tablet Please take instead of daily lasix. 5 Tab 0    levothyroxine (SYNTHROID) 88 mcg tablet Take 1 Tab by mouth daily. Indications: a condition with low thyroid hormone levels 90 Tab 1    atorvastatin (LIPITOR) 20 mg tablet Take 1 Tab by mouth every evening.  Indications: high cholesterol and high triglycerides 90 Tab 1     No Known Allergies  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    AV block, 1st degree     Back problem     Constipation 11/18/2020    Edema of lower extremity 11/18/2020    Headache     Hearing loss     Herpes     Hyperlipidemia     Hypertension     Incomplete rectal prolapse 11/18/2020    Joint pain     Osteoarthritis of both knees 11/18/2020    Sleep disorder     Insomnia    Thyroid disease      Past Surgical History:   Procedure Laterality Date    HX ACL RECONSTRUCTION      HX CATARACT REMOVAL      HX ORTHOPAEDIC      knee replacment 1n 1986 & Feb 2015     Family History   Problem Relation Age of Onset    Heart Disease Mother     Heart Disease Father      Social History     Tobacco Use    Smoking status: Never Smoker    Smokeless tobacco: Never Used   Substance Use Topics    Alcohol use: No           Review of Systems   Constitutional: Negative for chills, fever and malaise/fatigue. Respiratory: Negative for shortness of breath. Cardiovascular: Positive for leg swelling. Negative for chest pain and palpitations. Musculoskeletal: Negative for joint pain and myalgias. Skin: Positive for rash (redness swelling to right food and left 3rd digit). Physical Exam  Constitutional:       Appearance: She is obese. HENT:      Head: Normocephalic. Cardiovascular:      Pulses: Normal pulses. Musculoskeletal:      Right lower le+ Pitting Edema present. Left lower le+ Pitting Edema present. Feet:    Feet:      Right foot:      Skin integrity: Erythema and dry skin present. Toenail Condition: Right toenails are abnormally thick. Fungal disease present. Skin:     Findings: Abscess (nailbed left 3rd digit.) present. Neurological:      Mental Status: She is alert and oriented to person, place, and time. Psychiatric:         Mood and Affect: Mood normal.         Behavior: Behavior normal.           ASSESSMENT and PLAN  Diagnoses and all orders for this visit:    1. Cellulitis and abscess of leg  Assessment & Plan:  Rm Skye out the keflex. According to patient area is looking much better compared to visits previously but she has a history of cellulitis and she notes that it will return if not completely resolved when she finishes the antibiotics. Has been on two 10 day rounds of keflex. Still has indurated area with redness noted. Should do foot soaks or use warm compresses to foot. If no improvement after finishing keflex or if the redness/inflamamtion starts to get worse after she finishes the keflex then she is to start the clindamycin that was sent to the pharmacy.          Orders:  -     clindamycin (CLEOCIN) 300 mg capsule; Take 1 Cap by mouth three (3) times daily for 10 days. 2. Paronychia of finger of left hand  Assessment & Plan:  Area cleaned with alcohol then 22 gauge need inserted parallel into infected area of the cuticle. About 0.5 ml of pus was expressed. Bacitracin and bandage applied to finger. Patient should use warm soaks on finger. Call if the infection is getting worse.           Brianna Abdi, NP

## 2020-11-18 NOTE — ASSESSMENT & PLAN NOTE
Finish out the [facility]. According to patient area is looking much better compared to visits previously but she has a history of cellulitis and she notes that it will return if not completely resolved when she finishes the antibiotics. Has been on two 10 day rounds of keflex. Still has indurated area with redness noted. Should do foot soaks or use warm compresses to foot. If no improvement after finishing keflex or if the redness/inflamamtion starts to get worse after she finishes the keflex then she is to start the clindamycin that was sent to the pharmacy.

## 2020-12-28 DIAGNOSIS — S76.012A STRAIN OF MUSCLE, FASCIA AND TENDON OF LEFT HIP, INITIAL ENCOUNTER: ICD-10-CM

## 2020-12-28 RX ORDER — DICLOFENAC SODIUM 10 MG/G
GEL TOPICAL
Qty: 200 G | Refills: 2 | Status: SHIPPED | OUTPATIENT
Start: 2020-12-28 | End: 2021-08-11 | Stop reason: SDUPTHER

## 2021-02-16 ENCOUNTER — OFFICE VISIT (OUTPATIENT)
Dept: PRIMARY CARE CLINIC | Age: 86
End: 2021-02-16
Payer: MEDICARE

## 2021-02-16 VITALS
OXYGEN SATURATION: 98 % | BODY MASS INDEX: 36.44 KG/M2 | HEART RATE: 78 BPM | HEIGHT: 61 IN | SYSTOLIC BLOOD PRESSURE: 149 MMHG | TEMPERATURE: 97.6 F | WEIGHT: 193 LBS | DIASTOLIC BLOOD PRESSURE: 87 MMHG

## 2021-02-16 DIAGNOSIS — R60.0 BILATERAL LEG EDEMA: ICD-10-CM

## 2021-02-16 DIAGNOSIS — N18.32 STAGE 3B CHRONIC KIDNEY DISEASE (HCC): ICD-10-CM

## 2021-02-16 DIAGNOSIS — E78.5 HYPERLIPIDEMIA LDL GOAL <100: Chronic | ICD-10-CM

## 2021-02-16 DIAGNOSIS — E03.9 HYPOTHYROIDISM, UNSPECIFIED TYPE: Primary | Chronic | ICD-10-CM

## 2021-02-16 PROCEDURE — 1101F PT FALLS ASSESS-DOCD LE1/YR: CPT | Performed by: NURSE PRACTITIONER

## 2021-02-16 PROCEDURE — G8536 NO DOC ELDER MAL SCRN: HCPCS | Performed by: NURSE PRACTITIONER

## 2021-02-16 PROCEDURE — 1090F PRES/ABSN URINE INCON ASSESS: CPT | Performed by: NURSE PRACTITIONER

## 2021-02-16 PROCEDURE — 99214 OFFICE O/P EST MOD 30 MIN: CPT | Performed by: NURSE PRACTITIONER

## 2021-02-16 PROCEDURE — G8427 DOCREV CUR MEDS BY ELIG CLIN: HCPCS | Performed by: NURSE PRACTITIONER

## 2021-02-16 PROCEDURE — G8432 DEP SCR NOT DOC, RNG: HCPCS | Performed by: NURSE PRACTITIONER

## 2021-02-16 PROCEDURE — G8417 CALC BMI ABV UP PARAM F/U: HCPCS | Performed by: NURSE PRACTITIONER

## 2021-02-16 RX ORDER — RAMIPRIL 1.25 MG/1
1.25 CAPSULE ORAL DAILY
COMMUNITY
Start: 2021-01-26 | End: 2022-07-12

## 2021-02-16 RX ORDER — ATORVASTATIN CALCIUM 20 MG/1
20 TABLET, FILM COATED ORAL EVERY EVENING
Qty: 90 TAB | Refills: 1 | Status: SHIPPED | OUTPATIENT
Start: 2021-02-16 | End: 2021-08-11 | Stop reason: SDUPTHER

## 2021-02-16 RX ORDER — FUROSEMIDE 20 MG/1
20 TABLET ORAL
Qty: 90 TAB | Refills: 1 | Status: SHIPPED | OUTPATIENT
Start: 2021-02-16 | End: 2021-08-11 | Stop reason: SDUPTHER

## 2021-02-16 RX ORDER — LEVOTHYROXINE SODIUM 88 UG/1
88 TABLET ORAL DAILY
Qty: 90 TAB | Refills: 1 | Status: SHIPPED | OUTPATIENT
Start: 2021-02-16 | End: 2021-08-11 | Stop reason: SDUPTHER

## 2021-02-16 NOTE — PROGRESS NOTES
HISTORY OF PRESENT ILLNESS  Alexis Larios is a 80 y.o. female presents for medication refill. 1.Mixed Hyperlipidemia: Well controlled on atorvastatin. Patient denies legs aching or muscle cramps. 2. Hypothyroidism: Well controlled on synthroid. Denies weight changes, chest pain, palpitations, insomnia or anxiety on this medication. 3. Leg edema: Patient reported that she uses 20 mg lasix PRN for her fluid in her legs. This has been working well for her. Patient would like Dr. Kahs Garza, cardiology and Dr. Kirstie Gray, nephrologist.     Last saw Dr. Kirstie Gray in December 2020, next appointment with Dr. Kash Garza in April. Vitals:    02/16/21 1411   BP: (!) 149/87   Pulse: 78   Temp: 97.6 °F (36.4 °C)   TempSrc: Temporal   SpO2: 98%   Weight: 193 lb (87.5 kg)   Height: 5' 1\" (1.549 m)     Patient Active Problem List   Diagnosis Code    Cellulitis and abscess of leg L03.119, L02.419    Right leg pain M79.604    Hyperlipidemia E78.5    Hypothyroidism E03.9    Severe obesity (BMI 35.0-39. 9) with comorbidity ([facility] [address] E66.01    Back problem M53.9    AV block, 1st degree I44.0    Hypertension I10    Herpes B00.9    Benign hypertensive CKD, stage 1-4 or unspecified chronic kidney disease I12.9    Cellulitis of right lower extremity L03.115    Stage 3b chronic kidney disease N18.32    Constipation K59.00    Edema of lower extremity R60.0    Atrioventricular block I44.30    Incomplete rectal prolapse K62.3    Osteoarthritis of both knees M17.0    Paronychia of finger of left hand L03.012     Patient Active Problem List    Diagnosis Date Noted    Constipation 11/18/2020    Edema of lower extremity 11/18/2020    Atrioventricular block 11/18/2020    Incomplete rectal prolapse 11/18/2020    Osteoarthritis of both knees 11/18/2020    Paronychia of finger of left hand 11/18/2020    Cellulitis of right lower extremity 11/10/2020    Stage 3b chronic kidney disease 11/10/2020    Benign hypertensive CKD, stage 1-4 or unspecified chronic kidney disease 10/29/2020    Back problem     AV block, 1st degree     Hypertension     Herpes     Severe obesity (BMI 35.0-39. 9) with comorbidity ([facility] [address] 03/22/2018    Cellulitis and abscess of leg 01/14/2013    Right leg pain 01/14/2013    Hyperlipidemia 01/14/2013    Hypothyroidism 01/14/2013     Current Outpatient Medications   Medication Sig Dispense Refill    ramipriL (ALTACE) 1.25 mg capsule Take 1.25 mg by mouth daily.  levothyroxine (SYNTHROID) 88 mcg tablet Take 1 Tab by mouth daily. Indications: a condition with low thyroid hormone levels 90 Tab 1    furosemide (LASIX) 20 mg tablet Take 1 Tab by mouth daily as needed (leg swelling). Please take instead of daily lasix. 90 Tab 1    atorvastatin (LIPITOR) 20 mg tablet Take 1 Tab by mouth every evening.  Indications: high cholesterol and high triglycerides 90 Tab 1    diclofenac (VOLTAREN) 1 % gel APPLY 2 GRAMS TO THE AFFECTED AREA(S) BY TOPICAL ROUTE 4 TIMES PER  g 2     No Known Allergies  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    AV block, 1st degree     Back problem     Constipation 11/18/2020    Edema of lower extremity 11/18/2020    Headache     Hearing loss     Herpes     Hyperlipidemia     Hypertension     Incomplete rectal prolapse 11/18/2020    Joint pain     Osteoarthritis of both knees 11/18/2020    Sleep disorder     Insomnia    Thyroid disease      Past Surgical History:   Procedure Laterality Date    HX ACL RECONSTRUCTION      HX CATARACT REMOVAL      HX ORTHOPAEDIC      knee replacment 1n 1986 & Feb 2015     Family History   Problem Relation Age of Onset    Heart Disease Mother     Heart Disease Father      Social History     Tobacco Use    Smoking status: Never Smoker    Smokeless tobacco: Never Used   Substance Use Topics    Alcohol use: No           Review of Systems   Constitutional: Negative for malaise/fatigue and weight loss. Eyes: Negative for blurred vision and double vision. Respiratory: Negative for cough and shortness of breath. Cardiovascular: Positive for leg swelling. Negative for chest pain, palpitations and orthopnea. Gastrointestinal: Negative for heartburn and nausea. Genitourinary: Negative. Musculoskeletal: Negative for joint pain and myalgias. Skin: Negative for itching and rash. Neurological: Negative for dizziness, tingling, weakness and headaches. Endo/Heme/Allergies: Does not bruise/bleed easily. Psychiatric/Behavioral: Negative for depression. The patient is not nervous/anxious. Physical Exam  Constitutional:       Appearance: Normal appearance. She is obese. HENT:      Head: Normocephalic. Eyes:      Extraocular Movements: Extraocular movements intact. Conjunctiva/sclera: Conjunctivae normal.      Pupils: Pupils are equal, round, and reactive to light. Neck:      Musculoskeletal: Normal range of motion and neck supple. Cardiovascular:      Rate and Rhythm: Normal rate and regular rhythm. Pulses: Normal pulses. Heart sounds: Normal heart sounds. Pulmonary:      Effort: Pulmonary effort is normal.      Breath sounds: Normal breath sounds. Musculoskeletal: Normal range of motion. Right lower le+ Pitting Edema present. Left lower le+ Pitting Edema present. Skin:     General: Skin is warm and dry. Neurological:      General: No focal deficit present. Mental Status: She is alert and oriented to person, place, and time. Psychiatric:         Mood and Affect: Mood normal.           ASSESSMENT and PLAN  Diagnoses and all orders for this visit:    1. Hypothyroidism, unspecified type  Comments:  well controlled on current dose of synthroid. Orders:  -     levothyroxine (SYNTHROID) 88 mcg tablet; Take 1 Tab by mouth daily.  Indications: a condition with low thyroid hormone levels  -     TSH 3RD GENERATION    2. Stage 3b chronic kidney disease  Comments: Followed by Dr. Yajaira Hutchinson. Orders:  -     METABOLIC PANEL, COMPREHENSIVE    3. Hyperlipidemia LDL goal <100  Comments:  stable on current regimen. Orders:  -     atorvastatin (LIPITOR) 20 mg tablet; Take 1 Tab by mouth every evening. Indications: high cholesterol and high triglycerides  -     METABOLIC PANEL, COMPREHENSIVE  -     LIPID PANEL    4. Bilateral leg edema  Comments:  lasix PRN for leg edema. Continue to wear compression stockings/prop legs up. Orders:  -     furosemide (LASIX) 20 mg tablet; Take 1 Tab by mouth daily as needed (leg swelling). Please take instead of daily lasix.   -     CBC WITH AUTOMATED DIFF  -     METABOLIC PANEL, COMPREHENSIVE         Marlin Loya NP

## 2021-02-17 DIAGNOSIS — E03.9 HYPOTHYROIDISM, UNSPECIFIED TYPE: Primary | ICD-10-CM

## 2021-02-17 LAB
ALBUMIN SERPL-MCNC: 4.2 G/DL (ref 3.6–4.6)
ALBUMIN/GLOB SERPL: 1.6 {RATIO} (ref 1.2–2.2)
ALP SERPL-CCNC: 81 IU/L (ref 39–117)
ALT SERPL-CCNC: 11 IU/L (ref 0–32)
AST SERPL-CCNC: 16 IU/L (ref 0–40)
BASOPHILS # BLD AUTO: 0 X10E3/UL (ref 0–0.2)
BASOPHILS NFR BLD AUTO: 1 %
BILIRUB SERPL-MCNC: 0.5 MG/DL (ref 0–1.2)
BUN SERPL-MCNC: 24 MG/DL (ref 8–27)
BUN/CREAT SERPL: 20 (ref 12–28)
CALCIUM SERPL-MCNC: 9.3 MG/DL (ref 8.7–10.3)
CHLORIDE SERPL-SCNC: 106 MMOL/L (ref 96–106)
CHOLEST SERPL-MCNC: 173 MG/DL (ref 100–199)
CO2 SERPL-SCNC: 22 MMOL/L (ref 20–29)
CREAT SERPL-MCNC: 1.21 MG/DL (ref 0.57–1)
EOSINOPHIL # BLD AUTO: 0.2 X10E3/UL (ref 0–0.4)
EOSINOPHIL NFR BLD AUTO: 4 %
ERYTHROCYTE [DISTWIDTH] IN BLOOD BY AUTOMATED COUNT: 13.1 % (ref 11.7–15.4)
GLOBULIN SER CALC-MCNC: 2.7 G/DL (ref 1.5–4.5)
GLUCOSE SERPL-MCNC: 108 MG/DL (ref 65–99)
HCT VFR BLD AUTO: 35.6 % (ref 34–46.6)
HDLC SERPL-MCNC: 45 MG/DL
HGB BLD-MCNC: 12.2 G/DL (ref 11.1–15.9)
IMM GRANULOCYTES # BLD AUTO: 0 X10E3/UL (ref 0–0.1)
IMM GRANULOCYTES NFR BLD AUTO: 0 %
LDLC SERPL CALC-MCNC: 99 MG/DL (ref 0–99)
LYMPHOCYTES # BLD AUTO: 1.5 X10E3/UL (ref 0.7–3.1)
LYMPHOCYTES NFR BLD AUTO: 28 %
MCH RBC QN AUTO: 30.5 PG (ref 26.6–33)
MCHC RBC AUTO-ENTMCNC: 34.3 G/DL (ref 31.5–35.7)
MCV RBC AUTO: 89 FL (ref 79–97)
MONOCYTES # BLD AUTO: 0.5 X10E3/UL (ref 0.1–0.9)
MONOCYTES NFR BLD AUTO: 10 %
NEUTROPHILS # BLD AUTO: 2.9 X10E3/UL (ref 1.4–7)
NEUTROPHILS NFR BLD AUTO: 57 %
PLATELET # BLD AUTO: 239 X10E3/UL (ref 150–450)
POTASSIUM SERPL-SCNC: 4.7 MMOL/L (ref 3.5–5.2)
PROT SERPL-MCNC: 6.9 G/DL (ref 6–8.5)
RBC # BLD AUTO: 4 X10E6/UL (ref 3.77–5.28)
SODIUM SERPL-SCNC: 145 MMOL/L (ref 134–144)
TRIGL SERPL-MCNC: 169 MG/DL (ref 0–149)
TSH SERPL DL<=0.005 MIU/L-ACNC: 0.19 UIU/ML (ref 0.45–4.5)
VLDLC SERPL CALC-MCNC: 29 MG/DL (ref 5–40)
WBC # BLD AUTO: 5.2 X10E3/UL (ref 3.4–10.8)

## 2021-03-04 NOTE — PROGRESS NOTES
Labs all look good except her thyroid is little off. Is she having any symptoms like chest pain, palpitations, trouble eating or sleeping? Can we also fax her results to her Dr. Ward Peoples [phone]  And Dr. Dennis Ely [phone].

## 2021-08-10 DIAGNOSIS — E78.5 HYPERLIPIDEMIA LDL GOAL <100: Chronic | ICD-10-CM

## 2021-08-10 DIAGNOSIS — E03.9 HYPOTHYROIDISM, UNSPECIFIED TYPE: Chronic | ICD-10-CM

## 2021-08-10 DIAGNOSIS — R60.0 BILATERAL LEG EDEMA: ICD-10-CM

## 2021-08-10 RX ORDER — FUROSEMIDE 20 MG/1
TABLET ORAL
Qty: 90 TABLET | Refills: 1 | OUTPATIENT
Start: 2021-08-10

## 2021-08-10 RX ORDER — ATORVASTATIN CALCIUM 20 MG/1
TABLET, FILM COATED ORAL
Qty: 90 TABLET | Refills: 1 | OUTPATIENT
Start: 2021-08-10

## 2021-08-10 RX ORDER — LEVOTHYROXINE SODIUM 88 UG/1
TABLET ORAL
Qty: 90 TABLET | Refills: 1 | OUTPATIENT
Start: 2021-08-10

## 2021-08-10 NOTE — PROGRESS NOTES
Jose Mcclain is a 80 y.o. female presents for Medicare wellness visit. This is a Subsequent Medicare Annual Wellness Visit (AWV), (Performed more than 12 months after effective date of Medicare Part B enrollment and 12 months after last preventive visit.)    I have reviewed the patient's medical history in detail and updated the computerized patient record. History obtained from: the patient. female  80 y.o. WHITE/NON-  Depression Risk Factor Screening:     3 most recent PHQ Screens 2/16/2021   Little interest or pleasure in doing things Not at all   Feeling down, depressed, irritable, or hopeless Not at all   Total Score PHQ 2 0          Fall Risk Factor Screening:     Fall Risk Assessment, last 12 mths 8/11/2021   Able to walk? Yes   Fall in past 12 months? 0   Do you feel unsteady? 0   Are you worried about falling 0       Alcohol Risk Screen    Do you average more than 1 drink per night or more than 7 drinks a week:  No    On any one occasion in the past three months have you have had more than 3 drinks containing alcohol:  No         Functional Ability and Level of Safety:    Hearing: The patient wears hearing aids. Activities of Daily Living: The home contains: no safety equipment. Patient does total self care      Ambulation: with no difficulty      Abuse Screen:  Patient is not abused         History and Pertinent Exam   Patient is due for chronic medical conditions and/or has acute concerns in addition to the medicare wellness visit and agrees to do both, understanding there may be a copay for the additional services provided. Other Concerns today: 1. Mixed Hyperlipidemia: Well controlled on atorvastatin. Patient denies legs aching or muscle cramps.      2. Hypothyroidism: Well controlled on synthroid.   Denies weight changes, chest pain, palpitations, insomnia or anxiety on this medication.      3. Leg edema: Patient reported that she uses 20 mg lasix PRN for her fluid in her legs. This has been working well for her.      Ear pain on left side x3-4 months, also feels tickling in back of her throat. Patient had noticed some blood when cleaning her ear a few weeks ago. Denies not take any treatments. Osteoarthritis: Patient reported that she has used diclofenac gel with relief in joint pain. Health & Diet:   Please describe your diet habits: regular diet  Do you get 5 servings of fruits or vegetables daily? yes  Do you exercise regularly? no    Review of Systems   Constitutional: Negative for malaise/fatigue and weight loss. HENT: Positive for ear pain and sore throat (tickle). Eyes: Negative for blurred vision and double vision. Respiratory: Negative for cough and shortness of breath. Cardiovascular: Negative for chest pain, palpitations and leg swelling. Gastrointestinal: Negative for heartburn and nausea. Musculoskeletal: Negative for joint pain and myalgias. Skin: Negative for itching and rash. Neurological: Negative for dizziness, tingling, loss of consciousness, weakness and headaches. Endo/Heme/Allergies: Does not bruise/bleed easily. Psychiatric/Behavioral: Negative for depression. The patient is not nervous/anxious. Reviewed PmHx, FmHx, SocHx as well as meds and allergies, updated and dated in the chart. Patient Active Problem List   Diagnosis Code    Cellulitis and abscess of leg L03.119, L02.419    Right leg pain M79.604    Hyperlipidemia E78.5    Hypothyroidism E03.9    Severe obesity (BMI 35.0-39. 9) with comorbidity ([facility] [address] E66.01    Back problem M53.9    AV block, 1st degree I44.0    Hypertension I10    Herpes B00.9    Benign hypertensive CKD, stage 1-4 or unspecified chronic kidney disease I12.9    Cellulitis of right lower extremity L03.115    Stage 3b chronic kidney disease (HCC) N18.32    Constipation K59.00    Edema of lower extremity R60.0    Atrioventricular block I44.30    Incomplete rectal prolapse K62.3    Osteoarthritis of both knees M17.0    Paronychia of finger of left hand L03.012     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    AV block, 1st degree     Back problem     Constipation 11/18/2020    Edema of lower extremity 11/18/2020    Headache     Hearing loss     Herpes     Hyperlipidemia     Hypertension     Incomplete rectal prolapse 11/18/2020    Joint pain     Osteoarthritis of both knees 11/18/2020    Sleep disorder     Insomnia    Thyroid disease       Past Surgical History:   Procedure Laterality Date    HX ACL RECONSTRUCTION      HX CATARACT REMOVAL      HX ORTHOPAEDIC      knee replacment 1n 1986 & Feb 2015     No Known Allergies  Current Outpatient Medications   Medication Sig Dispense Refill    levothyroxine (SYNTHROID) 88 mcg tablet Take 1 Tablet by mouth daily. Indications: a condition with low thyroid hormone levels 90 Tablet 1    furosemide (LASIX) 20 mg tablet Take 1 Tablet by mouth daily as needed (leg swelling). Please take instead of daily lasix. 90 Tablet 1    diclofenac (VOLTAREN) 1 % gel Apply 4 g to affected area every six (6) hours. 200 g 5    atorvastatin (LIPITOR) 20 mg tablet Take 1 Tablet by mouth every evening. Indications: high cholesterol and high triglycerides 90 Tablet 1    ramipriL (ALTACE) 1.25 mg capsule Take 1.25 mg by mouth daily. Family History   Problem Relation Age of Onset    Heart Disease Mother     Heart Disease Father      Social History     Tobacco Use    Smoking status: Never Smoker    Smokeless tobacco: Never Used   Substance Use Topics    Alcohol use: No         BP Readings from Last 3 Encounters:   08/11/21 (!) 144/86   02/16/21 (!) 149/87   11/18/20 (!) 134/56      Wt Readings from Last 3 Encounters:   08/11/21 196 lb (88.9 kg)   02/16/21 193 lb (87.5 kg)   11/18/20 196 lb (88.9 kg)     Body mass index is 37.03 kg/m².    No exam data present  Physical Exam  Constitutional:       Appearance: Normal appearance. She is obese. HENT:      Head: Normocephalic and atraumatic. Right Ear: Tympanic membrane, ear canal and external ear normal.      Left Ear: Tympanic membrane, ear canal and external ear normal. There is impacted cerumen. Nose: Nose normal.      Mouth/Throat:      Mouth: Mucous membranes are moist.      Pharynx: Oropharynx is clear. Eyes:      Extraocular Movements: Extraocular movements intact. Conjunctiva/sclera: Conjunctivae normal.      Pupils: Pupils are equal, round, and reactive to light. Cardiovascular:      Rate and Rhythm: Normal rate and regular rhythm. Pulses: Normal pulses. Heart sounds: Normal heart sounds. Pulmonary:      Effort: Pulmonary effort is normal.      Breath sounds: Normal breath sounds. Abdominal:      General: Abdomen is flat. Bowel sounds are normal.      Palpations: Abdomen is soft. Musculoskeletal:         General: Normal range of motion. Cervical back: Normal range of motion and neck supple. Right lower le+ Pitting Edema present. Left lower le+ Pitting Edema present. Skin:     General: Skin is warm and dry. Capillary Refill: Capillary refill takes less than 2 seconds. Neurological:      General: No focal deficit present. Mental Status: She is alert and oriented to person, place, and time. Psychiatric:         Mood and Affect: Mood normal.         Thought Content: Thought content normal.         Judgment: Judgment normal.       Was the patient's timed Up & Go test unsteady or longer than 30 seconds? no    Evaluation of Cognitive Function   Mood/affect:  neutral, happy  Orientation: Person, Place, Time and Situation  Appearance: age appropriate and casually dressed  Family member/caregiver input: None  Normal cognitive function  Specialists/Care Team   Do Mathis has established care with the following healthcare providers:    Patient Care Team:  Excell Areas, NP as PCP - General (Nurse Practitioner)  Irma Toledo MD as Physician (Cardiology)  Artemio Barbosa MD as Physician (Nephrology)      [facility] Maintenance Topics with due status: Overdue       Topic Date Due    DTaP/Tdap/Td series Never done    Shingrix Vaccine Age 50> Never done    Bone Densitometry (Dexa) Screening Never done     Health Maintenance Topics with due status: Not Due       Topic Last Completion Date    Flu Vaccine 01/14/2013    Lipid Screen 02/16/2021    Medicare Yearly Exam 08/11/2021     Health Maintenance Topics with due status: Completed       Topic Last Completion Date    Pneumococcal 65+ years 10/26/2018    COVID-19 Vaccine 05/22/2021         Colon cancer:  Recommendation: Colonoscopy every 10y or annual FIT test from 50-75 or every 3 year stool DNA based test with consideration of ongoing screening from 76-85. and Not Indicated    Lung cancer (LDCT): Recommendation: Yearly LDCT for pts 55-77 w 30-pack year hx and currently smoke or quit <15 yr ago. and Not Indicated    Hepatitis C:  Recommendation: One time screening for all patient's aged 18-79. and Up to date or Completed    Diabetes:   Recommendation: USPSTF recommends screening ages 38-69 y/o if overweight or obese. Medicare covers screening in those patients who are overweight, obese, have HTN or dyslipiemia, a personal history of gestational diabetes or prior elevated blood sugar, a family hx of DM, or any patient over age 72 and Up to date or Completed    Lipids:   Recommendation: screening for hyperlipidemia every 5 years after age 39 and Up to date or Completed        PREVENTIVE CARE - FEMALE SCREENINGS     Cervical cancer: Recommendation: Every 3 yr from 21-29 and every 5 yr from 33-67, with Pap and HPV testing. and Not Indicated    Breast cancer: Recommendation:  USPSTF recommends screening mammography every 2 yr from 54-69.  The decision to start screening mammography in women prior to age 48 years should be an individual one. Women who place a higher value on the potential benefit than the potential harms may choose to begin biennial screening between the ages of 36 and 52 years. Medicare covers annual screening mammography, USPSTF also recommends women with a personal or family history of breast, ovarian, tubal, or peritoneal cancer or who have an ancestry ([employer]kenazi Orthodoxy) associated with breast cancer susceptibility 1 and 2 (BRCA1/2) gene mutations with an appropriate brief familial risk assessment tool. Women with a positive result on the risk assessment tool should receive genetic counseling and, if indicated after counseling, genetic testing and mammogram is not Indicated    Osteoporosis: Recommendation: Screen all women at 72, earlier if elevated risk and Done elsewhere, record not available    AAA:   Recommendation: One-time screening if family history of AAA and Not Indicated    IMMUNIZATIONS     Immunization History   Administered Date(s) Administered    COVID-19, PFIZER, MRNA, LNP-S, PF, 30MCG/0.3ML DOSE 05/01/2021, 05/22/2021    Influenza Vaccine PF 01/14/2013    Pneumococcal Polysaccharide (PPSV-23) 01/14/2013, 10/26/2018    Zoster Vaccine, Live 07/12/2021       Pneumovax:   Recommendation: PPSV23 once for all >65 and high risk <65  and Up to date or Completed    Prevnar:   Recommendation: PCV13 only if >65 and immunocompromised or residing in a nursing home, or in areas of low childhood Pneumococcal vaccination and Not Indicated    Influenza:   Recommendation: Vaccination annually, high dose if 65 or older and Up to date or Completed    Shingrix:  Recommendation: Vaccination 2 shots 2-6 months apart for all age >47 and Up to date or Completed    TDaP:    Recommendation: Vaccination Booster with TDaP every 10 yr. and Not Indicated    Discussion of Advance Directive   Discussed with Paulo Dunn.  Vijaya freitas ability to prepare and advance directive in the case that an injury or illness causes her to be unable to make health care decisions. Date of ACP Conversation: 08/11/21  Persons included in Conversation:  patient  Length of ACP Conversation in minutes:  <16 minutes (Non-Billable)    Authorized Decision Maker (if patient is incapable of making informed decisions): This person is:   Named in Advance Directive or Healthcare Power of             For Patients with Decision Making Capacity:   Values/Goals: Exploration of values, goals, and preferences if recovery is not expected, even with continued medical treatment in the event of:  Imminent death    Conversation Outcomes / Follow-Up Plan:   ACP complete - no further action today    Assessment/Plan   V70.0,     Diagnoses and all orders for this visit:    1. Medicare annual wellness visit, subsequent    2. Hypothyroidism, unspecified type  Comments:  well controlled on current dose of synthroid. Orders:  -     levothyroxine (SYNTHROID) 88 mcg tablet; Take 1 Tablet by mouth daily. Indications: a condition with low thyroid hormone levels  -     TSH 3RD GENERATION    3. Bilateral leg edema  Comments:  lasix PRN for leg edema. Continue to wear compression stockings/prop legs up. Orders:  -     furosemide (LASIX) 20 mg tablet; Take 1 Tablet by mouth daily as needed (leg swelling). Please take instead of daily lasix. -     METABOLIC PANEL, COMPREHENSIVE    4. Strain of muscle, fascia and tendon of left hip, initial encounter    5. Hyperlipidemia LDL goal <100  Comments:  stable on current regimen. Orders:  -     atorvastatin (LIPITOR) 20 mg tablet; Take 1 Tablet by mouth every evening. Indications: high cholesterol and high triglycerides  -     LIPID PANEL    6. Primary osteoarthritis of both knees  -     diclofenac (VOLTAREN) 1 % gel; Apply 4 g to affected area every six (6) hours. 7. Impacted cerumen of left ear  Comments:  wax removed by Page Larkin.       Orders:  -     REMOVAL IMPACTED CERUMEN IRRIGATION/LVG UNILAT    8. Post-nasal drainage  Comments:  discussed using flonase but patient declined. Can use benadryl/antihistamine to help with symptoms.                Carolina Hinojosa, NP

## 2021-08-11 ENCOUNTER — OFFICE VISIT (OUTPATIENT)
Dept: PRIMARY CARE CLINIC | Age: 86
End: 2021-08-11
Payer: MEDICARE

## 2021-08-11 VITALS
HEIGHT: 61 IN | RESPIRATION RATE: 18 BRPM | SYSTOLIC BLOOD PRESSURE: 144 MMHG | BODY MASS INDEX: 37 KG/M2 | TEMPERATURE: 97.1 F | WEIGHT: 196 LBS | HEART RATE: 65 BPM | DIASTOLIC BLOOD PRESSURE: 86 MMHG | OXYGEN SATURATION: 96 %

## 2021-08-11 DIAGNOSIS — E78.5 HYPERLIPIDEMIA LDL GOAL <100: Chronic | ICD-10-CM

## 2021-08-11 DIAGNOSIS — E03.9 HYPOTHYROIDISM, UNSPECIFIED TYPE: Chronic | ICD-10-CM

## 2021-08-11 DIAGNOSIS — M17.0 PRIMARY OSTEOARTHRITIS OF BOTH KNEES: ICD-10-CM

## 2021-08-11 DIAGNOSIS — R09.82 POST-NASAL DRAINAGE: ICD-10-CM

## 2021-08-11 DIAGNOSIS — H61.22 IMPACTED CERUMEN OF LEFT EAR: ICD-10-CM

## 2021-08-11 DIAGNOSIS — S76.012A STRAIN OF MUSCLE, FASCIA AND TENDON OF LEFT HIP, INITIAL ENCOUNTER: ICD-10-CM

## 2021-08-11 DIAGNOSIS — Z00.00 MEDICARE ANNUAL WELLNESS VISIT, SUBSEQUENT: Primary | ICD-10-CM

## 2021-08-11 DIAGNOSIS — R60.0 BILATERAL LEG EDEMA: ICD-10-CM

## 2021-08-11 PROCEDURE — G8536 NO DOC ELDER MAL SCRN: HCPCS | Performed by: NURSE PRACTITIONER

## 2021-08-11 PROCEDURE — 1090F PRES/ABSN URINE INCON ASSESS: CPT | Performed by: NURSE PRACTITIONER

## 2021-08-11 PROCEDURE — 1101F PT FALLS ASSESS-DOCD LE1/YR: CPT | Performed by: NURSE PRACTITIONER

## 2021-08-11 PROCEDURE — 99214 OFFICE O/P EST MOD 30 MIN: CPT | Performed by: NURSE PRACTITIONER

## 2021-08-11 PROCEDURE — G8417 CALC BMI ABV UP PARAM F/U: HCPCS | Performed by: NURSE PRACTITIONER

## 2021-08-11 PROCEDURE — G8427 DOCREV CUR MEDS BY ELIG CLIN: HCPCS | Performed by: NURSE PRACTITIONER

## 2021-08-11 PROCEDURE — 69209 REMOVE IMPACTED EAR WAX UNI: CPT | Performed by: NURSE PRACTITIONER

## 2021-08-11 PROCEDURE — G0439 PPPS, SUBSEQ VISIT: HCPCS | Performed by: NURSE PRACTITIONER

## 2021-08-11 PROCEDURE — G8432 DEP SCR NOT DOC, RNG: HCPCS | Performed by: NURSE PRACTITIONER

## 2021-08-11 RX ORDER — FUROSEMIDE 20 MG/1
20 TABLET ORAL
Qty: 90 TABLET | Refills: 1 | Status: SHIPPED | OUTPATIENT
Start: 2021-08-11 | End: 2022-02-07

## 2021-08-11 RX ORDER — LEVOTHYROXINE SODIUM 88 UG/1
88 TABLET ORAL DAILY
Qty: 90 TABLET | Refills: 1 | Status: SHIPPED | OUTPATIENT
Start: 2021-08-11 | End: 2022-02-07

## 2021-08-11 RX ORDER — DICLOFENAC SODIUM 10 MG/G
4 GEL TOPICAL EVERY 6 HOURS
Qty: 200 G | Refills: 5 | Status: SHIPPED | OUTPATIENT
Start: 2021-08-11

## 2021-08-11 RX ORDER — ATORVASTATIN CALCIUM 20 MG/1
20 TABLET, FILM COATED ORAL EVERY EVENING
Qty: 90 TABLET | Refills: 1 | Status: SHIPPED | OUTPATIENT
Start: 2021-08-11 | End: 2022-02-07

## 2021-08-11 NOTE — PROGRESS NOTES
Chief Complaint   Patient presents with    Follow Up Chronic Condition     pt is asking for a refill on levothyroxine, lipitor, lasix, diclofenac     Visit Vitals  BP (!) 165/99 (BP 1 Location: Right arm, BP Patient Position: At rest, BP Cuff Size: Adult)   Pulse 65   Temp 97.1 °F (36.2 °C) (Temporal)   Resp 18   Ht 5' 1\" (1.549 m)   Wt 196 lb (88.9 kg)   SpO2 96%   BMI 37.03 kg/m²     1. Have you been to the ER, urgent care clinic since your last visit? Hospitalized since your last visit?no    2. Have you seen or consulted any other health care providers outside of the [address] [place] [address] since your last visit? Include any pap smears or colon screening.  no

## 2021-08-12 LAB
ALBUMIN SERPL-MCNC: 4.2 G/DL (ref 3.5–4.6)
ALBUMIN/GLOB SERPL: 1.6 {RATIO} (ref 1.2–2.2)
ALP SERPL-CCNC: 77 IU/L (ref 48–121)
ALT SERPL-CCNC: 10 IU/L (ref 0–32)
AST SERPL-CCNC: 13 IU/L (ref 0–40)
BILIRUB SERPL-MCNC: 0.4 MG/DL (ref 0–1.2)
BUN SERPL-MCNC: 28 MG/DL (ref 10–36)
BUN/CREAT SERPL: 17 (ref 12–28)
CALCIUM SERPL-MCNC: 9.1 MG/DL (ref 8.7–10.3)
CHLORIDE SERPL-SCNC: 105 MMOL/L (ref 96–106)
CHOLEST SERPL-MCNC: 177 MG/DL (ref 100–199)
CO2 SERPL-SCNC: 22 MMOL/L (ref 20–29)
CREAT SERPL-MCNC: 1.63 MG/DL (ref 0.57–1)
GLOBULIN SER CALC-MCNC: 2.6 G/DL (ref 1.5–4.5)
GLUCOSE SERPL-MCNC: 107 MG/DL (ref 65–99)
HDLC SERPL-MCNC: 48 MG/DL
LDLC SERPL CALC-MCNC: 99 MG/DL (ref 0–99)
POTASSIUM SERPL-SCNC: 4.7 MMOL/L (ref 3.5–5.2)
PROT SERPL-MCNC: 6.8 G/DL (ref 6–8.5)
SODIUM SERPL-SCNC: 141 MMOL/L (ref 134–144)
TRIGL SERPL-MCNC: 175 MG/DL (ref 0–149)
TSH SERPL DL<=0.005 MIU/L-ACNC: 0.36 UIU/ML (ref 0.45–4.5)
VLDLC SERPL CALC-MCNC: 30 MG/DL (ref 5–40)

## 2021-08-12 NOTE — PROGRESS NOTES
Labs show that kidney function is elevated but pretty similar to previous testing so we will continue to monitor. Thyroid level is a little low but since she is not symptomatic we will keep levothyroxine at the same dose. Cholesterol levels look pretty good.        I'll send records to her cardiologist.

## 2022-02-08 ENCOUNTER — OFFICE VISIT (OUTPATIENT)
Dept: PRIMARY CARE CLINIC | Age: 87
End: 2022-02-08
Payer: MEDICARE

## 2022-02-08 VITALS
SYSTOLIC BLOOD PRESSURE: 130 MMHG | RESPIRATION RATE: 18 BRPM | TEMPERATURE: 96.9 F | DIASTOLIC BLOOD PRESSURE: 56 MMHG | HEART RATE: 62 BPM | WEIGHT: 205.3 LBS | HEIGHT: 61 IN | OXYGEN SATURATION: 97 % | BODY MASS INDEX: 38.76 KG/M2

## 2022-02-08 DIAGNOSIS — E03.9 HYPOTHYROIDISM, UNSPECIFIED TYPE: Chronic | ICD-10-CM

## 2022-02-08 DIAGNOSIS — R60.9 PERIPHERAL EDEMA: Primary | ICD-10-CM

## 2022-02-08 DIAGNOSIS — E78.5 HYPERLIPIDEMIA, UNSPECIFIED HYPERLIPIDEMIA TYPE: Chronic | ICD-10-CM

## 2022-02-08 DIAGNOSIS — E66.01 SEVERE OBESITY (BMI 35.0-39.9) WITH COMORBIDITY (HCC): Chronic | ICD-10-CM

## 2022-02-08 DIAGNOSIS — M25.551 RIGHT HIP PAIN: Chronic | ICD-10-CM

## 2022-02-08 PROCEDURE — 99214 OFFICE O/P EST MOD 30 MIN: CPT

## 2022-02-08 PROCEDURE — 1090F PRES/ABSN URINE INCON ASSESS: CPT

## 2022-02-08 PROCEDURE — 1101F PT FALLS ASSESS-DOCD LE1/YR: CPT

## 2022-02-08 PROCEDURE — G8427 DOCREV CUR MEDS BY ELIG CLIN: HCPCS

## 2022-02-08 PROCEDURE — G8417 CALC BMI ABV UP PARAM F/U: HCPCS

## 2022-02-08 PROCEDURE — G8432 DEP SCR NOT DOC, RNG: HCPCS

## 2022-02-08 PROCEDURE — G8536 NO DOC ELDER MAL SCRN: HCPCS

## 2022-02-08 RX ORDER — FUROSEMIDE 40 MG/1
TABLET ORAL
Qty: 60 TABLET | Refills: 1 | Status: SHIPPED | OUTPATIENT
Start: 2022-02-08 | End: 2022-05-10 | Stop reason: SDUPTHER

## 2022-02-08 NOTE — PATIENT INSTRUCTIONS
High Blood Pressure: Care Instructions  Overview     It's normal for blood pressure to go up and down throughout the day. But if it stays up, you have high blood pressure. Another name for high blood pressure is hypertension. Despite what a lot of people think, high blood pressure usually doesn't cause headaches or make you feel dizzy or lightheaded. It usually has no symptoms. But it does increase your risk of stroke, heart attack, and other problems. You and your doctor will talk about your risks of these problems based on your blood pressure. Your doctor will give you a goal for your blood pressure. Your goal will be based on your health and your age. Lifestyle changes, such as eating healthy and being active, are always important to help lower blood pressure. You might also take medicine to reach your blood pressure goal.  Follow-up care is a key part of your treatment and safety. Be sure to make and go to all appointments, and call your doctor if you are having problems. It's also a good idea to know your test results and keep a list of the medicines you take. How can you care for yourself at home? Medical treatment  · If you stop taking your medicine, your blood pressure will go back up. You may take one or more types of medicine to lower your blood pressure. Be safe with medicines. Take your medicine exactly as prescribed. Call your doctor if you think you are having a problem with your medicine. · Talk to your doctor before you start taking aspirin every day. Aspirin can help certain people lower their risk of a heart attack or stroke. But taking aspirin isn't right for everyone, because it can cause serious bleeding. · See your doctor regularly. You may need to see the doctor more often at first or until your blood pressure comes down. · If you are taking blood pressure medicine, talk to your doctor before you take decongestants or anti-inflammatory medicine, such as ibuprofen.  Some of these medicines can raise blood pressure. · Learn how to check your blood pressure at home. Lifestyle changes  · Stay at a healthy weight. This is especially important if you put on weight around the waist. Losing even 10 pounds can help you lower your blood pressure. · If your doctor recommends it, get more exercise. Walking is a good choice. Bit by bit, increase the amount you walk every day. Try for at least 30 minutes on most days of the week. You also may want to swim, bike, or do other activities. · Avoid or limit alcohol. Talk to your doctor about whether you can drink any alcohol. · Try to limit how much sodium you eat to less than 2,300 milligrams (mg) a day. Your doctor may ask you to try to eat less than 1,500 mg a day. · Eat plenty of fruits (such as bananas and oranges), vegetables, legumes, whole grains, and low-fat dairy products. · Lower the amount of saturated fat in your diet. Saturated fat is found in animal products such as milk, cheese, and meat. Limiting these foods may help you lose weight and also lower your risk for heart disease. · Do not smoke. Smoking increases your risk for heart attack and stroke. If you need help quitting, talk to your doctor about stop-smoking programs and medicines. These can increase your chances of quitting for good. When should you call for help? Call 911  anytime you think you may need emergency care. This may mean having symptoms that suggest that your blood pressure is causing a serious heart or blood vessel problem. Your blood pressure may be over 180/120. For example, call 911 if:    · You have symptoms of a heart attack. These may include:  ? Chest pain or pressure, or a strange feeling in the chest.  ? Sweating. ? Shortness of breath. ? Nausea or vomiting. ? Pain, pressure, or a strange feeling in the back, neck, jaw, or upper belly or in one or both shoulders or arms. ? Lightheadedness or sudden weakness.   ? A fast or irregular heartbeat.     · You have symptoms of a stroke. These may include:  ? Sudden numbness, tingling, weakness, or loss of movement in your face, arm, or leg, especially on only one side of your body. ? Sudden vision changes. ? Sudden trouble speaking. ? Sudden confusion or trouble understanding simple statements. ? Sudden problems with walking or balance. ? A sudden, severe headache that is different from past headaches.     · You have severe back or belly pain. Do not wait until your blood pressure comes down on its own. Get help right away. Call your doctor now or seek immediate care if:    · Your blood pressure is much higher than normal (such as 180/120 or higher), but you don't have symptoms.     · You think high blood pressure is causing symptoms, such as:  ? Severe headache.  ? Blurry vision. Watch closely for changes in your health, and be sure to contact your doctor if:    · Your blood pressure measures higher than your doctor recommends at least 2 times. That means the top number is higher or the bottom number is higher, or both.     · You think you may be having side effects from your blood pressure medicine. Where can you learn more? Go to http://www.gray.com/  Enter [id number] in the search box to learn more about \"High Blood Pressure: Care Instructions. \"  Current as of: April 29, 2021               Content Version: 13.0  © 2006-2021 [employer]. Care instructions adapted under license by [employer] (which disclaims liability or warranty for this information). If you have questions about a medical condition or this instruction, always ask your healthcare professional. [place] [address] any warranty or liability for your use of this information.

## 2022-02-08 NOTE — PROGRESS NOTES
HPI     Chief Complaint   Patient presents with    Genu Varum      legs and feet swelling    Follow-up     6 month f/u    Medication Refill     refill Altace,Valtaren gel        HPI:  Jake Morrison is a 80 y.o. female who presents to the office today secondary to increased edema to the lower extremities. This is a chronic condition that has been addressed in the past with Lasix and exercise. Patient is no longer able to exercise on treadmill per her cardiologist secondary to increased fall risk. 1.Mixed Hyperlipidemia: Well controlled on atorvastatin. Patient denies legs aching or muscle cramps.      2. Hypothyroidism: Well controlled on synthroid. Denies weight changes, chest pain, palpitations, insomnia or anxiety on this medication.      3. Leg edema: Patient reported that she uses 20 mg lasix PRN for her fluid in her legs. Patient states that the dependent edema is worse than before. Patient recently seen last week by her cardiologist.  No changes in medication per the patient.     Patient would like Dr. Ericka Perez, cardiology and Dr. Carla Goldstein, nephrologist.     No Known Allergies    Current Outpatient Medications   Medication Sig    furosemide (LASIX) 40 mg tablet Take one tablet daily as needed for leg swelling.  atorvastatin (LIPITOR) 20 mg tablet TAKE 1 TABLET BY MOUTH EVERY EVENING FOR HIGH CHOLESTEROL    levothyroxine (SYNTHROID) 88 mcg tablet TAKE 1 TABLET BY MOUTH DAILY    diclofenac (VOLTAREN) 1 % gel Apply 4 g to affected area every six (6) hours.  ramipriL (ALTACE) 1.25 mg capsule Take 1.25 mg by mouth daily. No current facility-administered medications for this visit. Review of Systems   Constitutional: Negative for malaise/fatigue and weight loss. Eyes: Negative for blurred vision and double vision. Respiratory: Negative for cough and shortness of breath. Cardiovascular: Negative for chest pain, palpitations and leg swelling.    Gastrointestinal: Negative for heartburn and nausea. Musculoskeletal: Positive for joint pain (Right hip pain). Negative for myalgias. Skin: Negative for itching and rash. Neurological: Negative for dizziness, tingling, loss of consciousness, weakness and headaches. Endo/Heme/Allergies: Does not bruise/bleed easily. Psychiatric/Behavioral: Negative for depression. The patient is not nervous/anxious. All other systems reviewed and are negative. Reviewed PmHx, FmHx, SocHx as well as meds and allergies, updated and dated in the chart. Objective     Visit Vitals  BP (!) 130/56 (BP 1 Location: Right arm, BP Patient Position: Sitting, BP Cuff Size: Adult)   Pulse 62   Temp 96.9 °F (36.1 °C)   Resp 18   Ht 5' 1\" (1.549 m)   Wt 205 lb 4.8 oz (93.1 kg)   SpO2 97%   BMI 38.79 kg/m²     Physical Exam  Vitals and nursing note reviewed. Constitutional:       Appearance: Normal appearance. She is normal weight. HENT:      Head: Normocephalic. Eyes:      Extraocular Movements: Extraocular movements intact. Conjunctiva/sclera: Conjunctivae normal.      Pupils: Pupils are equal, round, and reactive to light. Cardiovascular:      Rate and Rhythm: Normal rate and regular rhythm. Pulses: Normal pulses. Heart sounds: Normal heart sounds. Pulmonary:      Effort: Pulmonary effort is normal.      Breath sounds: Normal breath sounds. Musculoskeletal:         General: Normal range of motion. Cervical back: Normal range of motion and neck supple. Skin:     General: Skin is warm and dry. Capillary Refill: Capillary refill takes 2 to 3 seconds. Findings: Erythema (Increased edema to the lower extremities pitting x2) present. Neurological:      General: No focal deficit present. Mental Status: She is alert and oriented to person, place, and time.    Psychiatric:         Mood and Affect: Mood normal.     Last PDMP Lily Armenta as Reviewed:  Review User Review Instant Review Result   Olga Shanks 2/8/2022 10:15 PM Reviewed PDMP [1]           Assessment and Plan     Diagnoses and all orders for this visit:    1. Peripheral edema  -     furosemide (LASIX) 40 mg tablet; Take one tablet daily as needed for leg swelling.  -     CBC WITH AUTOMATED DIFF; Future    2. Severe obesity (BMI 35.0-39. 9) with comorbidity ([facility] [address]  Assessment & Plan:   uncontrolled, changes made today: Discussed diet and exercise. Orders:  -     METABOLIC PANEL, COMPREHENSIVE; Future  -     CBC WITH AUTOMATED DIFF; Future    3. Right hip pain  Comments:  Right-sided hip pain that radiates down the right foot. Pain has been chronic, described as sharp sensation, shooting pains. Orders:  -     REFERRAL TO PHYSICAL THERAPY    4. Hypothyroidism, unspecified type  Assessment & Plan:   asymptomatic,     Orders:  -     T4, FREE  -     TSH 3RD GENERATION    5. Hyperlipidemia, unspecified hyperlipidemia type  Assessment & Plan:   asymptomatic, continue current medications pending work up below, medication adherence emphasized    Orders:  -     LIPID PANEL; Future  -     METABOLIC PANEL, COMPREHENSIVE; Future  -     CBC WITH AUTOMATED DIFF; Future       Medication Side Effects and Warnings were discussed with patient. Patient Labs were reviewed and or requested. Patient Past Records were reviewed and or requested. Follow-up and Dispositions    Return in about 2 months (around 4/8/2022) for Chronic Conditions/Medications. I have discussed the diagnosis with the patient and the intended plan as seen in the above orders. The patient has received an after-visit summary and questions were answered concerning future plans. I have discussed medication side effects and warnings with the patient as well. Pillo Jane, [address] [facility]  [address]

## 2022-02-08 NOTE — PROGRESS NOTES
Chief Complaint   Patient presents with    Genu Varum      legs and feet swelling    Follow-up     6 month f/u    Medication Refill     refill Altace,Valtaren gel     Visit Vitals  BP (!) 130/56 (BP 1 Location: Right arm, BP Patient Position: Sitting, BP Cuff Size: Adult)   Pulse 62   Temp 96.9 °F (36.1 °C)   Resp 18   Ht 5' 1\" (1.549 m)   Wt 205 lb 4.8 oz (93.1 kg)   SpO2 97%   BMI 38.79 kg/m²   1. Have you been to the ER, urgent care clinic since your last visit? Hospitalized since your last visit? no    2. Have you seen or consulted any other health care providers outside of the [address] [place] [address] since your last visit? Include any pap smears or colon screening.   no

## 2022-02-09 LAB
ALBUMIN SERPL-MCNC: 4 G/DL (ref 3.5–4.6)
ALBUMIN/GLOB SERPL: 1.5 {RATIO} (ref 1.2–2.2)
ALP SERPL-CCNC: 86 IU/L (ref 44–121)
ALT SERPL-CCNC: 14 IU/L (ref 0–32)
AST SERPL-CCNC: 18 IU/L (ref 0–40)
BASOPHILS # BLD AUTO: 0 X10E3/UL (ref 0–0.2)
BASOPHILS NFR BLD AUTO: 1 %
BILIRUB SERPL-MCNC: 0.3 MG/DL (ref 0–1.2)
BUN SERPL-MCNC: 22 MG/DL (ref 10–36)
BUN/CREAT SERPL: 15 (ref 12–28)
CALCIUM SERPL-MCNC: 9.1 MG/DL (ref 8.7–10.3)
CHLORIDE SERPL-SCNC: 106 MMOL/L (ref 96–106)
CHOLEST SERPL-MCNC: 174 MG/DL (ref 100–199)
CO2 SERPL-SCNC: 21 MMOL/L (ref 20–29)
CREAT SERPL-MCNC: 1.49 MG/DL (ref 0.57–1)
EOSINOPHIL # BLD AUTO: 0.3 X10E3/UL (ref 0–0.4)
EOSINOPHIL NFR BLD AUTO: 5 %
ERYTHROCYTE [DISTWIDTH] IN BLOOD BY AUTOMATED COUNT: 12.6 % (ref 11.7–15.4)
GLOBULIN SER CALC-MCNC: 2.7 G/DL (ref 1.5–4.5)
GLUCOSE SERPL-MCNC: 109 MG/DL (ref 65–99)
HCT VFR BLD AUTO: 35.9 % (ref 34–46.6)
HDLC SERPL-MCNC: 46 MG/DL
HGB BLD-MCNC: 11.2 G/DL (ref 11.1–15.9)
IMM GRANULOCYTES # BLD AUTO: 0 X10E3/UL (ref 0–0.1)
IMM GRANULOCYTES NFR BLD AUTO: 1 %
LDLC SERPL CALC-MCNC: 105 MG/DL (ref 0–99)
LYMPHOCYTES # BLD AUTO: 1.4 X10E3/UL (ref 0.7–3.1)
LYMPHOCYTES NFR BLD AUTO: 28 %
MCH RBC QN AUTO: 29 PG (ref 26.6–33)
MCHC RBC AUTO-ENTMCNC: 31.2 G/DL (ref 31.5–35.7)
MCV RBC AUTO: 93 FL (ref 79–97)
MONOCYTES # BLD AUTO: 0.5 X10E3/UL (ref 0.1–0.9)
MONOCYTES NFR BLD AUTO: 11 %
NEUTROPHILS # BLD AUTO: 2.7 X10E3/UL (ref 1.4–7)
NEUTROPHILS NFR BLD AUTO: 54 %
PLATELET # BLD AUTO: 244 X10E3/UL (ref 150–450)
POTASSIUM SERPL-SCNC: 5 MMOL/L (ref 3.5–5.2)
PROT SERPL-MCNC: 6.7 G/DL (ref 6–8.5)
RBC # BLD AUTO: 3.86 X10E6/UL (ref 3.77–5.28)
SODIUM SERPL-SCNC: 142 MMOL/L (ref 134–144)
T4 FREE SERPL-MCNC: 1.65 NG/DL (ref 0.82–1.77)
TRIGL SERPL-MCNC: 131 MG/DL (ref 0–149)
TSH SERPL DL<=0.005 MIU/L-ACNC: 0.33 UIU/ML (ref 0.45–4.5)
VLDLC SERPL CALC-MCNC: 23 MG/DL (ref 5–40)
WBC # BLD AUTO: 4.9 X10E3/UL (ref 3.4–10.8)

## 2022-03-18 PROBLEM — K62.3 INCOMPLETE RECTAL PROLAPSE: Status: ACTIVE | Noted: 2020-11-18

## 2022-03-18 PROBLEM — K59.00 CONSTIPATION: Status: ACTIVE | Noted: 2020-11-18

## 2022-03-19 PROBLEM — L03.012 PARONYCHIA OF FINGER OF LEFT HAND: Status: ACTIVE | Noted: 2020-11-18

## 2022-03-19 PROBLEM — M17.0 OSTEOARTHRITIS OF BOTH KNEES: Status: ACTIVE | Noted: 2020-11-18

## 2022-03-19 PROBLEM — R60.0 EDEMA OF LOWER EXTREMITY: Status: ACTIVE | Noted: 2020-11-18

## 2022-03-19 PROBLEM — I44.30 ATRIOVENTRICULAR BLOCK: Status: ACTIVE | Noted: 2020-11-18

## 2022-03-19 PROBLEM — E66.01 SEVERE OBESITY (BMI 35.0-39.9) WITH COMORBIDITY (HCC): Status: ACTIVE | Noted: 2018-03-22

## 2022-03-19 PROBLEM — L03.115 CELLULITIS OF RIGHT LOWER EXTREMITY: Status: ACTIVE | Noted: 2020-11-10

## 2022-03-19 PROBLEM — N18.32 STAGE 3B CHRONIC KIDNEY DISEASE (HCC): Status: ACTIVE | Noted: 2020-11-10

## 2022-03-19 PROBLEM — I12.9 BENIGN HYPERTENSIVE CKD, STAGE 1-4 OR UNSPECIFIED CHRONIC KIDNEY DISEASE: Status: ACTIVE | Noted: 2020-10-29

## 2022-03-24 ENCOUNTER — NURSE TRIAGE (OUTPATIENT)
Dept: OTHER | Facility: CLINIC | Age: 87
End: 2022-03-24

## 2022-03-24 ENCOUNTER — TELEPHONE (OUTPATIENT)
Dept: PRIMARY CARE CLINIC | Age: 87
End: 2022-03-24

## 2022-03-24 NOTE — TELEPHONE ENCOUNTER
Received call from Kaylene at [facility] with Red Flag Complaint. Subjective: Caller states \"Dizziness\"     Current Symptoms: Daughter in law called states that patient has c/o light headed and some nausea last 2 days. Patient describes it as \"Swimmy headed\"  Patient was taking aspirin for leg discomfort and family advised her to stop and take tylenol only. Has had it off and on for 3-4 months, it stops then it reoccurs. /60's yesterday. Leg discomfort is on going problem and has been seen for it in the past.  Dizziness is intermittent      Onset: 2 days ago; gradual    Associated Symptoms: NA    Pain Severity: 0/10; caller not with patient is described as muscle cramps; Temperature: denies     What has been tried: nothing    LMP: NA Pregnant: NA    Recommended disposition: See PCP within 24 Hours    Care advice provided, patient verbalizes understanding; denies any other questions or concerns; instructed to call back for any new or worsening symptoms. Patient/Caller agrees with recommended disposition; writer provided warm transfer to [place] at [facility] for appointment scheduling    Attention Provider: Thank you for allowing me to participate in the care of your patient. The patient was connected to triage in response to information provided to the [facility]. Please do not respond through this encounter as the response is not directed to a shared pool.       Reason for Disposition   [1] MODERATE dizziness (e.g., interferes with normal activities) AND [2] has NOT been evaluated by physician for this  (Exception: dizziness caused by heat exposure, sudden standing, or poor fluid intake)    Protocols used: [facility]

## 2022-03-24 NOTE — TELEPHONE ENCOUNTER
Spoke to patient, advised to go to the ER per MD. Patient states she will go to [facility] to be evaluated.

## 2022-05-07 DIAGNOSIS — E78.5 HYPERLIPIDEMIA LDL GOAL <100: Chronic | ICD-10-CM

## 2022-05-07 DIAGNOSIS — R60.0 BILATERAL LEG EDEMA: ICD-10-CM

## 2022-05-07 DIAGNOSIS — R60.9 PERIPHERAL EDEMA: ICD-10-CM

## 2022-05-07 DIAGNOSIS — E03.9 HYPOTHYROIDISM, UNSPECIFIED TYPE: Chronic | ICD-10-CM

## 2022-05-10 RX ORDER — FUROSEMIDE 20 MG/1
TABLET ORAL
Qty: 90 TABLET | Refills: 0 | OUTPATIENT
Start: 2022-05-10

## 2022-05-10 RX ORDER — FUROSEMIDE 40 MG/1
TABLET ORAL
Qty: 60 TABLET | Refills: 1 | Status: SHIPPED | OUTPATIENT
Start: 2022-05-10

## 2022-05-10 RX ORDER — ATORVASTATIN CALCIUM 20 MG/1
TABLET, FILM COATED ORAL
Qty: 90 TABLET | Refills: 0 | Status: SHIPPED | OUTPATIENT
Start: 2022-05-10 | End: 2022-09-29

## 2022-05-10 RX ORDER — LEVOTHYROXINE SODIUM 88 UG/1
TABLET ORAL
Qty: 90 TABLET | Refills: 0 | Status: SHIPPED | OUTPATIENT
Start: 2022-05-10 | End: 2022-08-22 | Stop reason: SDUPTHER

## 2022-05-16 ENCOUNTER — TELEPHONE (OUTPATIENT)
Dept: PRIMARY CARE CLINIC | Age: 87
End: 2022-05-16

## 2022-05-16 NOTE — TELEPHONE ENCOUNTER
----- Message from Zach Maecristino sent at 5/16/2022 11:55 AM EDT -----  Subject: Message to Provider    QUESTIONS  Information for Provider? Madi Rhoades with [facility] wants to know if   Amarilis muller will follow home care orders. ---------------------------------------------------------------------------  --------------  Cate Isai INFO  What is the best way for the office to contact you? Do not leave any   message, patient will call back for answer  Preferred Call Back Phone Number? [phone]  ---------------------------------------------------------------------------  --------------  SCRIPT ANSWERS  Relationship to Patient? Third Party  Third Party Type? Home Health Care? Representative Name?  Madi Rhoades

## 2022-05-17 ENCOUNTER — TELEPHONE (OUTPATIENT)
Dept: PRIMARY CARE CLINIC | Age: 87
End: 2022-05-17

## 2022-05-19 ENCOUNTER — OFFICE VISIT (OUTPATIENT)
Dept: PRIMARY CARE CLINIC | Age: 87
End: 2022-05-19
Payer: MEDICARE

## 2022-05-19 VITALS
BODY MASS INDEX: 38.33 KG/M2 | DIASTOLIC BLOOD PRESSURE: 50 MMHG | RESPIRATION RATE: 18 BRPM | HEIGHT: 61 IN | WEIGHT: 203 LBS | HEART RATE: 71 BPM | SYSTOLIC BLOOD PRESSURE: 125 MMHG | OXYGEN SATURATION: 95 %

## 2022-05-19 DIAGNOSIS — R60.0 EDEMA OF LOWER EXTREMITY: Primary | ICD-10-CM

## 2022-05-19 DIAGNOSIS — N18.32 STAGE 3B CHRONIC KIDNEY DISEASE (HCC): ICD-10-CM

## 2022-05-19 DIAGNOSIS — M79.604 PAIN IN RIGHT LEG: ICD-10-CM

## 2022-05-19 PROCEDURE — 99215 OFFICE O/P EST HI 40 MIN: CPT | Performed by: FAMILY MEDICINE

## 2022-05-19 RX ORDER — TRAMADOL HYDROCHLORIDE 50 MG/1
50 TABLET ORAL
Qty: 28 TABLET | Refills: 0 | Status: SHIPPED | OUTPATIENT
Start: 2022-05-19 | End: 2022-06-02

## 2022-05-19 NOTE — PROGRESS NOTES
Yogesh Muhammad (: 1931) is a 80 y.o. female, established patient, here for evaluation of the following chief complaint(s): Other (Leg pain, surgery on , swelling BLE)       ASSESSMENT/PLAN:  Below is the assessment and plan developed based on review of pertinent history, physical exam, labs, studies, and medications. 1. Edema of lower extremity  Chronic  Patient had DVT rule out when she was in rehab post surgery, right leg swollen greater than left leg possibly post surgery, leg elevation, will see if we can get a pneumatic compression device as leg stockings may be difficult to apply, tramadol twice a day as needed for pain, discontinue Voltaren, may take Tylenol as needed. Continue furosemide 20 mg daily. Low-sodium. 2. Pain in right leg  Acute on chronic  -     traMADoL (ULTRAM) 50 mg tablet; Take 1 Tablet by mouth every twelve (12) hours as needed for Pain for up to 14 days. Max Daily Amount: 100 mg., Normal, Disp-28 Tablet, R-0  3. Stage 3b chronic kidney disease ([address]  Chronic      Return if symptoms worsen or fail to improve. SUBJECTIVE/OBJECTIVE:  HPI    80-year-old female history of hypertension, hypothyroidism, osteoarthritis, stage III chronic kidney disease, bilateral leg edema presents for leg swelling and pain status post right hip replacement surgery 2022. Patient has bilateral swelling, right leg is considerably more swollen after surgery compared to her left leg. She has had right knee replacement surgery in the 1960s and revision surgery in . Patient is complaining of severe pain from the knee down in her right leg. Pain is located laterally along knee, travels down the side of the leg encompassing her entire right foot. Patient is also developed a stasis dermatitis rash distally on both legs. She has been applying cortisone and has had some relief from itching.   Patient has also been instructed to take furosemide 20 mg once a day for the leg swelling by her kidney specialist.  She has not attempted leg elevation or compression possibly because it is too difficult for her to do so given her large body habitus. Patient lives by herself, has family members and neighbors to come and check in on her. Patient states she is not able to sleep due to the considerable level of pain she is in all the time. She has been applying Voltaren gel which was prescribed by another provider which she states has not helped. She is followed by cardiology, Dr. Jacquie Griffith who is treating her CHF. She is euthyroid. No Known Allergies  Current Outpatient Medications   Medication Sig    traMADoL (ULTRAM) 50 mg tablet Take 1 Tablet by mouth every twelve (12) hours as needed for Pain for up to 14 days. Max Daily Amount: 100 mg.    atorvastatin (LIPITOR) 20 mg tablet TAKE 1 TABLET BY MOUTH EVERY EVENING FOR HIGH CHOLESTEROL    levothyroxine (SYNTHROID) 88 mcg tablet TAKE 1 TABLET BY MOUTH DAILY    furosemide (LASIX) 40 mg tablet Take one tablet daily as needed for leg swelling.  diclofenac (VOLTAREN) 1 % gel Apply 4 g to affected area every six (6) hours.  ramipriL (ALTACE) 1.25 mg capsule Take 1.25 mg by mouth daily. No current facility-administered medications for this visit.      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    AV block, 1st degree     Back problem     Constipation 11/18/2020    Edema of lower extremity 11/18/2020    Headache     Hearing loss     Herpes     Hyperlipidemia     Hypertension     Incomplete rectal prolapse 11/18/2020    Joint pain     Osteoarthritis of both knees 11/18/2020    Sleep disorder     Insomnia    Thyroid disease      Past Surgical History:   Procedure Laterality Date    HX ACL RECONSTRUCTION      HX CATARACT REMOVAL      HX ORTHOPAEDIC      knee replacment 1n 1986 & Feb 2015     Family History   Problem Relation Age of Onset    Heart Disease Mother  Heart Disease Father      Social History     Tobacco Use   Smoking Status Never Smoker   Smokeless Tobacco Never Used         Review of Systems   All other systems reviewed and are negative. BP (!) 125/50 (BP 1 Location: Left upper arm, BP Patient Position: Sitting, BP Cuff Size: Adult)   Pulse 71   Resp 18   Ht 5' 1\" (1.549 m)   Wt 203 lb (92.1 kg)   SpO2 95%   BMI 38.36 kg/m²    Physical Exam  Constitutional:       General: She is not in acute distress. Appearance: She is obese. Comments: Seated in wheelchair   HENT:      Head: Normocephalic. Cardiovascular:      Rate and Rhythm: Regular rhythm. Heart sounds: Normal heart sounds. Comments: Right leg measures 19 inches in circumference, left leg measures 16 inches in circumference. Pulmonary:      Breath sounds: Normal breath sounds. Abdominal:      General: Bowel sounds are normal.      Palpations: Abdomen is soft. Musculoskeletal:      Cervical back: Neck supple. Right knee: Decreased range of motion. Left knee: Decreased range of motion. Right lower leg: 3+ Edema present. Left lower le+ Edema present. Comments: Diffuse tenderness all along the right leg below the knees. No erythema or streaking other than stasis dermatitis skin changes proximal to the ankles bilaterally. Neurological:      Mental Status: She is alert. On this date 2022 I have spent 50 minutes reviewing previous notes, test results and face to face with the patient discussing the diagnosis and importance of compliance with the treatment plan as well as documenting on the day of the visit. An electronic signature was used to authenticate this note.   -- Bill Rodriguez MD   [facility] [address]

## 2022-05-19 NOTE — PROGRESS NOTES
Chief Complaint   Patient presents with    Other     Leg pain, surgery on April 30th, swelling BLE     Visit Vitals  BP (!) 125/50 (BP 1 Location: Left upper arm, BP Patient Position: Sitting, BP Cuff Size: Adult)   Pulse 71   Resp 18   Ht 5' 1\" (1.549 m)   Wt 203 lb (92.1 kg)   SpO2 95%   BMI 38.36 kg/m²       1. \"Have you been to the ER, urgent care clinic since your last visit? Hospitalized since your last visit? \" Yes When: April 30, 2022    2. \"Have you seen or consulted any other health care providers outside of the [address] [place] [address] since your last visit? \" Yes Where: [facility]     3. For patients aged 39-70: Has the patient had a colonoscopy / FIT/ Cologuard? No      If the patient is female:    4. For patients aged 41-77: Has the patient had a mammogram within the past 2 years? NA - based on age or sex      11. For patients aged 21-65: Has the patient had a pap smear?  NA - based on age or sex

## 2022-06-01 ENCOUNTER — TELEPHONE (OUTPATIENT)
Dept: PRIMARY CARE CLINIC | Age: 87
End: 2022-06-01

## 2022-06-01 DIAGNOSIS — M62.830 BACK SPASM: Primary | ICD-10-CM

## 2022-06-01 RX ORDER — METHOCARBAMOL 500 MG/1
500 TABLET, FILM COATED ORAL
Qty: 30 TABLET | Refills: 0 | Status: SHIPPED | OUTPATIENT
Start: 2022-06-01 | End: 2022-07-12

## 2022-06-01 NOTE — TELEPHONE ENCOUNTER
Called and spoke with patient's DIL [facility]. Patient had hip fracture and knee injury x2 months ago. Patient continuing to have muscle spasms/pain to hip and lower back. Requesting muscle relaxant. Also using narcotics and was given valium by ER. Patient taking keflex for UTI. Will send in robaxin, discussed risk of falls/drowsiness with mixture of these medications. Patient has family with her at all times. If symptoms not improving she will follow up in office. Next apt with ortho is July 12th.

## 2022-06-03 ENCOUNTER — TELEPHONE (OUTPATIENT)
Dept: PRIMARY CARE CLINIC | Age: 87
End: 2022-06-03

## 2022-06-03 NOTE — TELEPHONE ENCOUNTER
Lots of swelling because of surgery, maintaining swelling. Area is bumpy, red.  Needs antibiotic cream for cellulitis

## 2022-06-06 NOTE — TELEPHONE ENCOUNTER
Needs to be seen an evaluated to determine best treatment. Oral verse topical antibiotics. Can follow up with us or with her surgeon.

## 2022-06-07 ENCOUNTER — TELEPHONE (OUTPATIENT)
Dept: PRIMARY CARE CLINIC | Age: 87
End: 2022-06-07

## 2022-06-07 NOTE — TELEPHONE ENCOUNTER
Home health is advising that patient is not having any improvement with physical therapy, patient complains of 10 out of 10 pain for her right knee. Hip is having no pain.

## 2022-06-07 NOTE — TELEPHONE ENCOUNTER
Attempted to call back. Left VM with Tara Jean-Baptiste to return our call. I am not managing this patients HH, her surgeon is as she just had a hip replacement.

## 2022-06-20 ENCOUNTER — TELEPHONE (OUTPATIENT)
Dept: PRIMARY CARE CLINIC | Age: 87
End: 2022-06-20

## 2022-06-20 ENCOUNTER — NURSE TRIAGE (OUTPATIENT)
Dept: OTHER | Facility: CLINIC | Age: 87
End: 2022-06-20

## 2022-06-20 NOTE — TELEPHONE ENCOUNTER
Pt made an appt with  on 7-20 (next available) she was previously seen in may and she is having a very hard time sleeping , is there anything we can suggest or send in for her before this appt? They have tried melatonin and tylenol pm it doesn't work.

## 2022-06-20 NOTE — TELEPHONE ENCOUNTER
Received call from Shirin Briggs at [facility] with Red Flag Complaint. Subjective: Caller states \"Fell and broke her hip, went to hospital and then nursing home. She was released back home because we wanted to care for her at home. She is getting stronger, but her sleep schedule is off. She is fatigued from getting no sleep, and starting to get depressed. Takes cat naps during day, but not for more than 10 minutes. \"     Current Symptoms: extreme fatigue, depressed, irritable, bilateral leg/ankle pain and mild swelling, intermittent anxiety    Onset: a few weeks ago; gradual, worsening    Associated Symptoms: reduced activity, increased wakefulness    Pain Severity: UTD; pain and dull; intermittent    Temperature: Denies      What has been tried: melatonin, tylenol PM. Nothing is working    LMP: NA Pregnant: NA    Recommended disposition: See PCP within 3 Days    Care advice provided, patient verbalizes understanding; denies any other questions or concerns; instructed to call back for any new or worsening symptoms. Patient/Caller agrees with recommended disposition; writer provided warm transfer to [place] at [facility] for appointment scheduling    Attention Provider: Thank you for allowing me to participate in the care of your patient. The patient was connected to triage in response to information provided to the [facility]. Please do not respond through this encounter as the response is not directed to a shared pool.     Reason for Disposition   Fatigue (i.e., tires easily, decreased energy) and persists > 1 week    Protocols used: WEAKNESS (GENERALIZED) AND FATIGUE-ADULT-[place]

## 2022-06-21 ENCOUNTER — VIRTUAL VISIT (OUTPATIENT)
Dept: PRIMARY CARE CLINIC | Age: 87
End: 2022-06-21
Payer: MEDICARE

## 2022-06-21 DIAGNOSIS — R53.83 FATIGUE, UNSPECIFIED TYPE: ICD-10-CM

## 2022-06-21 DIAGNOSIS — G47.9 SLEEP TROUBLE: Primary | ICD-10-CM

## 2022-06-21 PROCEDURE — 1101F PT FALLS ASSESS-DOCD LE1/YR: CPT | Performed by: FAMILY MEDICINE

## 2022-06-21 PROCEDURE — 99214 OFFICE O/P EST MOD 30 MIN: CPT | Performed by: FAMILY MEDICINE

## 2022-06-21 PROCEDURE — G8427 DOCREV CUR MEDS BY ELIG CLIN: HCPCS | Performed by: FAMILY MEDICINE

## 2022-06-21 PROCEDURE — 1090F PRES/ABSN URINE INCON ASSESS: CPT | Performed by: FAMILY MEDICINE

## 2022-06-21 PROCEDURE — 1123F ACP DISCUSS/DSCN MKR DOCD: CPT | Performed by: FAMILY MEDICINE

## 2022-06-21 PROCEDURE — G8432 DEP SCR NOT DOC, RNG: HCPCS | Performed by: FAMILY MEDICINE

## 2022-06-21 RX ORDER — DIAZEPAM 2 MG/1
TABLET ORAL
COMMUNITY
Start: 2022-06-03 | End: 2022-07-12

## 2022-06-21 RX ORDER — MELATONIN 10 MG
10 CAPSULE ORAL
Qty: 30 CAPSULE | Refills: 0 | Status: SHIPPED | OUTPATIENT
Start: 2022-06-21 | End: 2022-06-21 | Stop reason: ALTCHOICE

## 2022-06-21 NOTE — PROGRESS NOTES
There were no vitals taken for this visit. Chief Complaint   Patient presents with    Insomnia     Problems sleeping at night     1. \"Have you been to the ER, urgent care clinic since your last visit? Hospitalized since your last visit? \" No    2. \"Have you seen or consulted any other health care providers outside of the [address] [place] [address] since your last visit? \" No     3. For patients aged 39-70: Has the patient had a colonoscopy / FIT/ Cologuard? NA - based on age      If the patient is female:    4. For patients aged 41-77: Has the patient had a mammogram within the past 2 years? NA - based on age or sex      11. For patients aged 21-65: Has the patient had a pap smear?  NA - based on age or sex

## 2022-06-21 NOTE — PROGRESS NOTES
HPI     Chief Complaint   Patient presents with    Insomnia     Problems sleeping at night        HPI:  Jio Abraham is a 80 y.o. female who has a history of Hypothryoidism, S/P Hip arthroplasty. Patient is new to me. Chart reviewed. Sleep trouble: She had hip surgery April 2022 and is recovering well; however, she is complaining of knee pain and her surgeon prescribed gabapentin for the possible knee pain. She completed rehab and did well. Sleep trouble occurs intermittently since her surgery. She is not sleeping more than a couple of hours for 4 days now. They've tried Tylenol PM (did nothing but increased agitation) and melatonin 5mg which helped her sleep but then she woke up and stayed up for 4 hours. Patient is taking naps during the day. She does appear fatigued and \"out of it\". Caregiver, Julian Chi, is unsure if this is due to sleep or a concomittant symptom. She has no hx of dementia. No dysuria or foul smelling urine. No complaints of paresthesias and speech is fluent. Yesterday patient \"was completely normal\" but she appears out of today after not sleeping overnight. Gabapentin was just prescribed within the past week by Dr. Lottie Fontaine. Julian Chi is unsure who that is but assumes it may have been from the ER or her Ortho doctor, as she was complaining of neuropathic type knee pain. She took one pill of the gabapentin but did not likee how it made her feel. (I do not see outside ER records).  review shows Gabapentin given June 14th, Valium May 30th, Tramadol May 19th and 26th. Oxycodone May 6th. ROS  Reviewed PmHx, FmHx, SocHx as well as meds and allergies, updated and dated in the chart. Objective     Vital Signs: (As obtained by patient/caregiver at home)  There were no vitals taken for this visit. No flowsheet data found.     [INSTRUCTIONS:  \"[x]\" Indicates a positive item  \"[]\" Indicates a negative item  -- DELETE ALL ITEMS NOT EXAMINED]    Constitutional: [x] Appears well-developed and well-nourished [x] No apparent distress      [] Abnormal -     Mental status: [x] Alert and awake  [x] Oriented to person/place/time [x] Able to follow commands    [] Abnormal -     Eyes:   EOM    [x]  Normal    [] Abnormal -   Sclera  [x]  Normal    [] Abnormal -          Discharge [x]  None visible   [] Abnormal -     HENT: [x] Normocephalic, atraumatic  [] Abnormal -   [x] Mouth/Throat: Mucous membranes are moist    External Ears [x] Normal  [] Abnormal -    Neck: [x] No visualized mass [] Abnormal -     Pulmonary/Chest: [x] Respiratory effort normal   [x] No visualized signs of difficulty breathing or respiratory distress        [] Abnormal -      Musculoskeletal:   [x] Normal gait with no signs of ataxia         [x] Normal range of motion of neck        [] Abnormal -     Neurological:        [x] No Facial Asymmetry (Cranial nerve 7 motor function) (limited exam due to video visit)          [x] No gaze palsy        [] Abnormal -          Skin:        [x] No significant exanthematous lesions or discoloration noted on facial skin         [] Abnormal -            Psychiatric:       [x] Normal Affect [] Abnormal -        [x] No Hallucinations    Other pertinent observable physical exam findings:-          Assessment and Plan     Patient with increasing fatigue, confusion, sleep trouble s/p hip arthroplasty. It's hard to tell if these symptoms are from fatigue or are part of on whole syndrome. No hx to suggest UTI or CVA; however, strict precautions given and low threshold to go to ER for re-evaluation. We will try to reorient her during the day and do high dose melatonin 10mg to see if that helps. Chas Monreal will notify me of any concerns of if this does not help. Hold benadryl as it can cause paradoxical insomnia in geriatric patients. D/C gabapentin due to intolerance.     A total of 25 minutes was spent on the date of service reviewing previous chart and histories, counseling the patient on diagnoses, ordering tests, and documentation. Diagnoses and all orders for this visit:    1. Sleep trouble    2. Fatigue, unspecified type              Hernando Gonzalez is being evaluated by a Virtual Visit (video visit) encounter to address concerns as mentioned above. A caregiver was present when appropriate. Due to this being a TeleHealth encounter (During [facility]- public health emergency), evaluation of the following organ systems was limited: Vitals/Constitutional/EENT/Resp/CV/GI//MS/Neuro/Skin/Heme-Lymph-Imm. Pursuant to the emergency declaration under the [address] [place] [address], [address] [place] [address] authority and the [employer] Act, this Virtual Visit was conducted with patient's (and/or legal guardian's) consent, to reduce the patient's risk of exposure to COVID-19 and provide necessary medical care. The patient (and/or legal guardian) has also been advised to contact this office for worsening conditions or problems, and seek emergency medical treatment and/or call 911 if deemed necessary. Patient identification was verified at the start of the visit: YES    Services were provided through a video synchronous discussion virtually to substitute for in-person clinic visit. Patient was located at home and provider was located in office or at home.        Shirley Purcell MD  [address] [place] [address]

## 2022-06-22 ENCOUNTER — TELEPHONE (OUTPATIENT)
Dept: PRIMARY CARE CLINIC | Age: 87
End: 2022-06-22

## 2022-06-22 NOTE — TELEPHONE ENCOUNTER
[address] wants to know if pt can be prescribed gabapentin.  She's experiencing sharp shooting pain down the leg that is keeping her awake at night

## 2022-06-23 ENCOUNTER — APPOINTMENT (OUTPATIENT)
Dept: GENERAL RADIOLOGY | Age: 87
End: 2022-06-23
Attending: EMERGENCY MEDICINE
Payer: MEDICARE

## 2022-06-23 ENCOUNTER — HOSPITAL ENCOUNTER (EMERGENCY)
Age: 87
Discharge: HOME | End: 2022-06-23
Attending: EMERGENCY MEDICINE
Payer: MEDICARE

## 2022-06-23 VITALS
TEMPERATURE: 98.4 F | OXYGEN SATURATION: 99 % | WEIGHT: 205 LBS | HEIGHT: 67 IN | BODY MASS INDEX: 32.18 KG/M2 | RESPIRATION RATE: 18 BRPM | SYSTOLIC BLOOD PRESSURE: 120 MMHG | HEART RATE: 72 BPM | DIASTOLIC BLOOD PRESSURE: 50 MMHG

## 2022-06-23 DIAGNOSIS — N39.0 URINARY TRACT INFECTION WITHOUT HEMATURIA, SITE UNSPECIFIED: Primary | ICD-10-CM

## 2022-06-23 LAB
ALBUMIN SERPL-MCNC: 4.2 G/DL (ref 3.5–5.2)
ALBUMIN/GLOB SERPL: 1.3 {RATIO} (ref 1.1–2.2)
ALP SERPL-CCNC: 89 U/L (ref 35–104)
ALT SERPL-CCNC: 11 U/L (ref 10–35)
ANION GAP SERPL CALC-SCNC: 10 MMOL/L (ref 5–15)
APPEARANCE UR: ABNORMAL
AST SERPL-CCNC: 13 U/L (ref 10–35)
BACTERIA URNS QL MICRO: ABNORMAL /HPF
BASOPHILS # BLD: 0 K/UL (ref 0–0.1)
BASOPHILS NFR BLD: 1 % (ref 0–1)
BILIRUB SERPL-MCNC: 0.2 MG/DL (ref 0.2–1)
BILIRUB UR QL: NEGATIVE
BUN SERPL-MCNC: 30 MG/DL (ref 8–23)
BUN/CREAT SERPL: 25 (ref 12–20)
CALCIUM SERPL-MCNC: 9.4 MG/DL (ref 8.2–9.6)
CHLORIDE SERPL-SCNC: 103 MMOL/L (ref 98–107)
CO2 SERPL-SCNC: 25 MMOL/L (ref 22–29)
COLOR UR: ABNORMAL
CREAT SERPL-MCNC: 1.2 MG/DL (ref 0.5–0.9)
DIFFERENTIAL METHOD BLD: ABNORMAL
EOSINOPHIL # BLD: 0.3 K/UL (ref 0–0.4)
EOSINOPHIL NFR BLD: 5 % (ref 0–7)
EPITH CASTS URNS QL MICRO: ABNORMAL /LPF
ERYTHROCYTE [DISTWIDTH] IN BLOOD BY AUTOMATED COUNT: 13.9 % (ref 11.5–14.5)
GLOBULIN SER CALC-MCNC: 3.2 G/DL (ref 2–4)
GLUCOSE SERPL-MCNC: 112 MG/DL (ref 65–100)
GLUCOSE UR STRIP.AUTO-MCNC: NEGATIVE MG/DL
HCT VFR BLD AUTO: 31.3 % (ref 35–47)
HGB BLD-MCNC: 9.7 G/DL (ref 11.5–16)
HGB UR QL STRIP: NEGATIVE
HYALINE CASTS URNS QL MICRO: ABNORMAL /LPF
IMM GRANULOCYTES # BLD AUTO: 0 K/UL (ref 0–0.04)
IMM GRANULOCYTES NFR BLD AUTO: 0 % (ref 0–0.5)
KETONES UR QL STRIP.AUTO: NEGATIVE MG/DL
LEUKOCYTE ESTERASE UR QL STRIP.AUTO: ABNORMAL
LYMPHOCYTES # BLD: 1.4 K/UL (ref 0.8–3.5)
LYMPHOCYTES NFR BLD: 24 % (ref 12–49)
MCH RBC QN AUTO: 27.9 PG (ref 26–34)
MCHC RBC AUTO-ENTMCNC: 31 G/DL (ref 30–36.5)
MCV RBC AUTO: 89.9 FL (ref 80–99)
MONOCYTES # BLD: 0.7 K/UL (ref 0–1)
MONOCYTES NFR BLD: 12 % (ref 5–13)
NEUTS SEG # BLD: 3.5 K/UL (ref 1.8–8)
NEUTS SEG NFR BLD: 58 % (ref 32–75)
NITRITE UR QL STRIP.AUTO: POSITIVE
NRBC # BLD: 0 K/UL (ref 0–0.01)
NRBC BLD-RTO: 0 PER 100 WBC
PH UR STRIP: 6 [PH] (ref 5–8)
PLATELET # BLD AUTO: 265 K/UL (ref 150–400)
PMV BLD AUTO: 11.3 FL (ref 8.9–12.9)
POTASSIUM SERPL-SCNC: 4.4 MMOL/L (ref 3.5–5.1)
PROT SERPL-MCNC: 7.4 G/DL (ref 6.4–8.3)
PROT UR STRIP-MCNC: NEGATIVE MG/DL
RBC # BLD AUTO: 3.48 M/UL (ref 3.8–5.2)
RBC #/AREA URNS HPF: ABNORMAL /HPF
SODIUM SERPL-SCNC: 138 MMOL/L (ref 136–145)
SP GR UR REFRACTOMETRY: 1.01 (ref 1–1.03)
UR CULT HOLD, URHOLD: NORMAL
UROBILINOGEN UR QL STRIP.AUTO: 0.2 EU/DL (ref 0.2–1)
WBC # BLD AUTO: 6.1 K/UL (ref 3.6–11)
WBC URNS QL MICRO: ABNORMAL /HPF (ref 0–4)

## 2022-06-23 PROCEDURE — 80053 COMPREHEN METABOLIC PANEL: CPT

## 2022-06-23 PROCEDURE — 87186 SC STD MICRODIL/AGAR DIL: CPT

## 2022-06-23 PROCEDURE — 87086 URINE CULTURE/COLONY COUNT: CPT

## 2022-06-23 PROCEDURE — 96374 THER/PROPH/DIAG INJ IV PUSH: CPT

## 2022-06-23 PROCEDURE — 87077 CULTURE AEROBIC IDENTIFY: CPT

## 2022-06-23 PROCEDURE — 74011250636 HC RX REV CODE- 250/636: Performed by: EMERGENCY MEDICINE

## 2022-06-23 PROCEDURE — 74011000250 HC RX REV CODE- 250: Performed by: EMERGENCY MEDICINE

## 2022-06-23 PROCEDURE — 85025 COMPLETE CBC W/AUTO DIFF WBC: CPT

## 2022-06-23 PROCEDURE — 99284 EMERGENCY DEPT VISIT MOD MDM: CPT

## 2022-06-23 PROCEDURE — 36415 COLL VENOUS BLD VENIPUNCTURE: CPT

## 2022-06-23 PROCEDURE — 81001 URINALYSIS AUTO W/SCOPE: CPT

## 2022-06-23 PROCEDURE — 71045 X-RAY EXAM CHEST 1 VIEW: CPT

## 2022-06-23 RX ORDER — CEPHALEXIN 500 MG/1
500 CAPSULE ORAL 2 TIMES DAILY
Qty: 14 CAPSULE | Refills: 0 | Status: SHIPPED | OUTPATIENT
Start: 2022-06-23 | End: 2022-06-30

## 2022-06-23 RX ADMIN — SODIUM CHLORIDE 1000 ML: 9 INJECTION, SOLUTION INTRAVENOUS at 20:45

## 2022-06-23 RX ADMIN — CEFTRIAXONE 1 G: 1 INJECTION, POWDER, FOR SOLUTION INTRAMUSCULAR; INTRAVENOUS at 20:44

## 2022-06-23 NOTE — TELEPHONE ENCOUNTER
Called and spoke with [facility], advised to follow up with Ortho Doctor regarding the Gabapentin.

## 2022-06-23 NOTE — PROGRESS NOTES
I received an on call page from patients granddaughter. Patient present. ID confirmed x2. Granddaughter requesting sleep aid. Ashley Bolivar has not slept in 3-4 days more than a couple of hours at a time. She is delirious, seems out of it and not herself. The confusion precedes the insomnia but its hard to say which came first.    I saw patient Tuesday virtually for similar. Illness symptoms denied at that time, including dy suria and cough. Granddaughter now says shes noticed Andre Delong is colder than usual to the touch, and has complained of vaginal irritation and itching. Although patient now denies. I spent ~25 minutes talking to patient, Granddaughter Cy Deiters, and patients son. I explained my limited ability to assess. Unable to prescribe controlled medications through call service,   A message was received earlier today for refil l of gabapentin which was prescribed by Ortho for knee pain. Patient denies pain currently. Patient also recently with UTI. This is a difficult situation. I have referred patient to UC to rule out infection such as UTI as cause for acute insomnia and mentation changes, especially given reported chills, vaginal irritation, and recent UTI.      Dr. Monika Vincent

## 2022-06-24 NOTE — ED PROVIDER NOTES
55-year-old female presenting to the ER with, fatigue concern for possible recurrent UTI. Patient's been treated for UTIs twice in the last 2 months. For the daughter's knowledge no culture has been obtained. Patient did have right hip surgery April 30. Having no complication of that however has complaints of right knee pain without any significant swelling and no tenderness. No leg swelling. No known fevers. Mild nasal congestion and intermittent nonproductive cough however patient denies feeling short of breath. Family also reports that patient has not been sleeping well. Was prescribed melatonin. No report of vomiting or diarrhea patient has been eating and drinking.            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    AV block, 1st degree     Back problem     Constipation 11/18/2020    Edema of lower extremity 11/18/2020    Headache     Hearing loss     Herpes     Hyperlipidemia     Hypertension     Incomplete rectal prolapse 11/18/2020    Joint pain     Osteoarthritis of both knees 11/18/2020    Sleep disorder     Insomnia    Thyroid disease        Past Surgical History:   Procedure Laterality Date    HX ACL RECONSTRUCTION      HX CATARACT REMOVAL      HX ORTHOPAEDIC      knee replacment 1n 1986 & Feb 2015         Family History:   Problem Relation Age of Onset    Heart Disease Mother     Heart Disease Father        Social History     Socioeconomic History    Marital status:      Spouse name: Not on file    Number of children: Not on file    Years of education: Not on file    Highest education level: Not on file   Occupational History    Not on file   Tobacco Use    Smoking status: Never Smoker    Smokeless tobacco: Never Used   Vaping Use    Vaping Use: Never used   Substance and Sexual Activity    Alcohol use: No    Drug use: No    Sexual activity: Not on file   Other Topics Concern    Not on file   Social History Narrative    Not on file     Social Determinants of Health     Financial Resource Strain:     Difficulty of Paying Living Expenses: Not on file   Food Insecurity:     Worried About Running Out of Food in the Last Year: Not on file    Anne of Food in the Last Year: Not on file   Transportation Needs:     Lack of Transportation (Medical): Not on file    Lack of Transportation (Non-Medical): Not on file   Physical Activity:     Days of Exercise per Week: Not on file    Minutes of Exercise per Session: Not on file   Stress:     Feeling of Stress : Not on file   Social Connections:     Frequency of Communication with Friends and Family: Not on file    Frequency of Social Gatherings with Friends and Family: Not on file    Attends Jehovah's witness Services: Not on file    Active Member of [address] [place] [address] [employer] or Organizations: Not on file    Attends Club or Organization Meetings: Not on file    Marital Status: Not on file   Intimate Partner Violence:     Fear of Current or Ex-Partner: Not on file    Emotionally Abused: Not on file    Physically Abused: Not on file    Sexually Abused: Not on file   Housing Stability:     Unable to Pay for Housing in the Last Year: Not on file    Number of [place] in the Last Year: Not on file    Unstable Housing in the Last Year: Not on file         ALLERGIES: Patient has no known allergies. Review of Systems   Constitutional: Positive for fatigue. Negative for chills and fever. HENT: Negative for congestion and sore throat. Eyes: Negative for pain. Respiratory: Positive for cough. Negative for shortness of breath. Cardiovascular: Negative for chest pain. Gastrointestinal: Negative for abdominal pain, diarrhea, nausea and vomiting. Genitourinary: Positive for urgency. Negative for dysuria and flank pain. Musculoskeletal: Negative for back pain and neck pain. Skin: Negative for rash. Neurological: Negative for dizziness and headaches.    All other systems reviewed and are negative. Vitals:    06/23/22 1911 06/23/22 1924   BP: (!) 120/50    Pulse: (!) 29 72   Resp: 18    Temp: 98.4 °F (36.9 °C)    SpO2: 99%    Weight: 93 kg (205 lb)    Height: 5' 7\" (1.702 m)             Physical Exam  Constitutional:       Appearance: She is well-developed. HENT:      Head: Normocephalic. Eyes:      Conjunctiva/sclera: Conjunctivae normal.   Cardiovascular:      Rate and Rhythm: Normal rate and regular rhythm. Pulmonary:      Effort: Pulmonary effort is normal. No respiratory distress. Breath sounds: Normal breath sounds. Abdominal:      General: Bowel sounds are normal.      Palpations: Abdomen is soft. Tenderness: There is no abdominal tenderness. Musculoskeletal:         General: Normal range of motion. Cervical back: Normal range of motion and neck supple. Skin:     General: Skin is warm. Capillary Refill: Capillary refill takes less than 2 seconds. Findings: No rash. Neurological:      Mental Status: She is alert and oriented to person, place, and time. Comments: No gross motor or sensory deficits          MDM  Number of Diagnoses or Management Options  Urinary tract infection without hematuria, site unspecified  Diagnosis management comments: Patient has normal labs and electrolytes. Chest x-ray unremarkable. Urine showing signs concerning for urinary tract infection. We will send urine culture and give a dose of IV Rocephin. Given prescription for Keflex. Discussed sleep hygiene    Discussed the discharge impression and any labs and the results with the patient. Answered any questions and addressed any concerns. Discussed the importance of following up with their primary care provider and/or specialist.  Discussed signs or symptoms that would warrant return back to the ER for further evaluation. The patient is agreeable with discharge.          Amount and/or Complexity of Data Reviewed  Clinical lab tests: reviewed  Tests in the radiology section of CPT®: reviewed  Decide to obtain previous medical records or to obtain history from someone other than the patient: yes      ED Course as of 06/24/22 0020   Thu Jun 23, 2022 2102 Creatinine(!): 1.20  Ckd   [ZD]      ED Course User Index  [ZD] Sameera Bill MD       Procedures      Recent Results (from the past 24 hour(s))   URINALYSIS W/MICROSCOPIC    Collection Time: 06/23/22  7:47 PM   Result Value Ref Range    Color YELLOW/STRAW      Appearance HAZY (A) CLEAR      Specific gravity 1.010 1.003 - 1.030      pH (UA) 6.0 5.0 - 8.0      Protein Negative NEG mg/dL    Glucose Negative NEG mg/dL    Ketone Negative NEG mg/dL    Bilirubin Negative NEG      Blood Negative NEG      Urobilinogen 0.2 0.2 - 1.0 EU/dL    Nitrites Positive (A) NEG      Leukocyte Esterase SMALL (A) NEG      WBC 20-50 0 - 4 /hpf    RBC 5-10 /hpf    Epithelial cells FEW FEW /lpf    Bacteria 2+ (A) NEG /hpf    Hyaline cast 0-2 (A) NEG /lpf   URINE CULTURE HOLD SAMPLE    Collection Time: 06/23/22  7:47 PM    Specimen: Serum; Urine   Result Value Ref Range    Urine culture hold        Urine on hold in Microbiology dept for 2 days. If unpreserved urine is submitted, it cannot be used for addtional testing after 24 hours, recollection will be required. CBC WITH AUTOMATED DIFF    Collection Time: 06/23/22  7:47 PM   Result Value Ref Range    WBC 6.1 3.6 - 11.0 K/uL    RBC 3.48 (L) 3.80 - 5.20 M/uL    HGB 9.7 (L) 11.5 - 16.0 g/dL    HCT 31.3 (L) 35.0 - 47.0 %    MCV 89.9 80.0 - 99.0 FL    MCH 27.9 26.0 - 34.0 PG    MCHC 31.0 30.0 - 36.5 g/dL    RDW 13.9 11.5 - 14.5 %    PLATELET 269 106 - 596 K/uL    MPV 11.3 8.9 - 12.9 FL    NRBC 0.0 0  WBC    ABSOLUTE NRBC 0.00 0.00 - 0.01 K/uL    NEUTROPHILS 58 32 - 75 %    LYMPHOCYTES 24 12 - 49 %    MONOCYTES 12 5 - 13 %    EOSINOPHILS 5 0 - 7 %    BASOPHILS 1 0 - 1 %    IMMATURE GRANULOCYTES 0 0.0 - 0.5 %    ABS. NEUTROPHILS 3.5 1.8 - 8.0 K/UL    ABS. LYMPHOCYTES 1.4 0.8 - 3.5 K/UL    ABS. MONOCYTES 0.7 0.0 - 1.0 K/UL    ABS. EOSINOPHILS 0.3 0.0 - 0.4 K/UL    ABS. BASOPHILS 0.0 0.0 - 0.1 K/UL    ABS. IMM. GRANS. 0.0 0.00 - 0.04 K/UL    DF AUTOMATED     METABOLIC PANEL, COMPREHENSIVE    Collection Time: 06/23/22  7:47 PM   Result Value Ref Range    Sodium 138 136 - 145 mmol/L    Potassium 4.4 3.5 - 5.1 mmol/L    Chloride 103 98 - 107 mmol/L    CO2 25 22 - 29 mmol/L    Anion gap 10 5 - 15 mmol/L    Glucose 112 (H) 65 - 100 mg/dL    BUN 30 (H) 8 - 23 MG/DL    Creatinine 1.20 (H) 0.50 - 0.90 MG/DL    BUN/Creatinine ratio 25 (H) 12 - 20      GFR est AA 51 (L) >60 ml/min/1.73m2    GFR est non-AA 42 (L) >60 ml/min/1.73m2    Calcium 9.4 8.2 - 9.6 MG/DL    Bilirubin, total 0.2 0.2 - 1.0 MG/DL    ALT (SGPT) 11 10 - 35 U/L    AST (SGOT) 13 10 - 35 U/L    Alk. phosphatase 89 35 - 104 U/L    Protein, total 7.4 6.4 - 8.3 g/dL    Albumin 4.2 3.5 - 5.2 g/dL    Globulin 3.2 2.0 - 4.0 g/dL    A-G Ratio 1.3 1.1 - 2.2         XR CHEST PORT    Result Date: 6/23/2022  EXAM: XR CHEST PORT HISTORY: cough. COMPARISON: 2/16/2015 FINDINGS: Single view(s) of the chest. Discoid atelectasis or scarring is seen in the left lateral costophrenic angle. . No focal consolidation, pleural effusion, or pneumothorax. The cardiomediastinal silhouette is unremarkable. The visualized osseous structures are unremarkable. No acute cardiopulmonary process.

## 2022-06-26 ENCOUNTER — APPOINTMENT (OUTPATIENT)
Dept: VASCULAR SURGERY | Age: 87
End: 2022-06-26
Attending: EMERGENCY MEDICINE
Payer: MEDICARE

## 2022-06-26 ENCOUNTER — HOSPITAL ENCOUNTER (EMERGENCY)
Age: 87
Discharge: HOME | End: 2022-06-26
Attending: EMERGENCY MEDICINE
Payer: MEDICARE

## 2022-06-26 VITALS
OXYGEN SATURATION: 96 % | HEART RATE: 67 BPM | RESPIRATION RATE: 18 BRPM | WEIGHT: 189 LBS | HEIGHT: 61 IN | BODY MASS INDEX: 35.68 KG/M2 | TEMPERATURE: 98.8 F | SYSTOLIC BLOOD PRESSURE: 146 MMHG | DIASTOLIC BLOOD PRESSURE: 57 MMHG

## 2022-06-26 DIAGNOSIS — R60.0 LOWER LEG EDEMA: ICD-10-CM

## 2022-06-26 DIAGNOSIS — N30.00 ACUTE CYSTITIS WITHOUT HEMATURIA: Primary | ICD-10-CM

## 2022-06-26 LAB
ALBUMIN SERPL-MCNC: 3.5 G/DL (ref 3.5–5)
ALBUMIN/GLOB SERPL: 0.8 {RATIO} (ref 1.1–2.2)
ALP SERPL-CCNC: 89 U/L (ref 45–117)
ALT SERPL-CCNC: 13 U/L (ref 12–78)
ANION GAP SERPL CALC-SCNC: 7 MMOL/L (ref 5–15)
APTT PPP: 25.5 SEC (ref 22.1–31)
AST SERPL-CCNC: 13 U/L (ref 15–37)
BACTERIA SPEC CULT: ABNORMAL
BACTERIA SPEC CULT: ABNORMAL
BASOPHILS # BLD: 0 K/UL (ref 0–0.1)
BASOPHILS NFR BLD: 1 % (ref 0–1)
BILIRUB SERPL-MCNC: 0.4 MG/DL (ref 0.2–1)
BUN SERPL-MCNC: 20 MG/DL (ref 6–20)
BUN/CREAT SERPL: 15 (ref 12–20)
CALCIUM SERPL-MCNC: 9.5 MG/DL (ref 8.5–10.1)
CC UR VC: ABNORMAL
CHLORIDE SERPL-SCNC: 108 MMOL/L (ref 97–108)
CO2 SERPL-SCNC: 25 MMOL/L (ref 21–32)
CREAT SERPL-MCNC: 1.37 MG/DL (ref 0.55–1.02)
DIFFERENTIAL METHOD BLD: ABNORMAL
EOSINOPHIL # BLD: 0.3 K/UL (ref 0–0.4)
EOSINOPHIL NFR BLD: 5 % (ref 0–7)
ERYTHROCYTE [DISTWIDTH] IN BLOOD BY AUTOMATED COUNT: 13.7 % (ref 11.5–14.5)
GLOBULIN SER CALC-MCNC: 4.2 G/DL (ref 2–4)
GLUCOSE SERPL-MCNC: 116 MG/DL (ref 65–100)
HCT VFR BLD AUTO: 31.3 % (ref 35–47)
HGB BLD-MCNC: 9.7 G/DL (ref 11.5–16)
IMM GRANULOCYTES # BLD AUTO: 0 K/UL (ref 0–0.04)
IMM GRANULOCYTES NFR BLD AUTO: 0 % (ref 0–0.5)
INR PPP: 1 (ref 0.9–1.1)
LACTATE BLD-SCNC: 0.83 MMOL/L (ref 0.4–2)
LYMPHOCYTES # BLD: 1 K/UL (ref 0.8–3.5)
LYMPHOCYTES NFR BLD: 16 % (ref 12–49)
MCH RBC QN AUTO: 28.2 PG (ref 26–34)
MCHC RBC AUTO-ENTMCNC: 31 G/DL (ref 30–36.5)
MCV RBC AUTO: 91 FL (ref 80–99)
MONOCYTES # BLD: 0.6 K/UL (ref 0–1)
MONOCYTES NFR BLD: 10 % (ref 5–13)
NEUTS SEG # BLD: 4.1 K/UL (ref 1.8–8)
NEUTS SEG NFR BLD: 68 % (ref 32–75)
NRBC # BLD: 0 K/UL (ref 0–0.01)
NRBC BLD-RTO: 0 PER 100 WBC
PLATELET # BLD AUTO: 238 K/UL (ref 150–400)
PMV BLD AUTO: 11.6 FL (ref 8.9–12.9)
POTASSIUM SERPL-SCNC: 4.1 MMOL/L (ref 3.5–5.1)
PROCALCITONIN SERPL-MCNC: 0.1 NG/ML
PROT SERPL-MCNC: 7.7 G/DL (ref 6.4–8.2)
PROTHROMBIN TIME: 10.4 SEC (ref 9–11.1)
RBC # BLD AUTO: 3.44 M/UL (ref 3.8–5.2)
SERVICE CMNT-IMP: ABNORMAL
SODIUM SERPL-SCNC: 140 MMOL/L (ref 136–145)
THERAPEUTIC RANGE,PTTT: NORMAL SECS (ref 58–77)
WBC # BLD AUTO: 6.1 K/UL (ref 3.6–11)

## 2022-06-26 PROCEDURE — 93971 EXTREMITY STUDY: CPT

## 2022-06-26 PROCEDURE — 99284 EMERGENCY DEPT VISIT MOD MDM: CPT

## 2022-06-26 PROCEDURE — 84145 PROCALCITONIN (PCT): CPT

## 2022-06-26 PROCEDURE — 85730 THROMBOPLASTIN TIME PARTIAL: CPT

## 2022-06-26 PROCEDURE — 85025 COMPLETE CBC W/AUTO DIFF WBC: CPT

## 2022-06-26 PROCEDURE — 83605 ASSAY OF LACTIC ACID: CPT

## 2022-06-26 PROCEDURE — 74011250637 HC RX REV CODE- 250/637: Performed by: EMERGENCY MEDICINE

## 2022-06-26 PROCEDURE — 36415 COLL VENOUS BLD VENIPUNCTURE: CPT

## 2022-06-26 PROCEDURE — 80053 COMPREHEN METABOLIC PANEL: CPT

## 2022-06-26 PROCEDURE — 85610 PROTHROMBIN TIME: CPT

## 2022-06-26 PROCEDURE — 87040 BLOOD CULTURE FOR BACTERIA: CPT

## 2022-06-26 RX ORDER — SULFAMETHOXAZOLE AND TRIMETHOPRIM 800; 160 MG/1; MG/1
1 TABLET ORAL
Status: COMPLETED | OUTPATIENT
Start: 2022-06-26 | End: 2022-06-26

## 2022-06-26 RX ORDER — SULFAMETHOXAZOLE AND TRIMETHOPRIM 800; 160 MG/1; MG/1
1 TABLET ORAL 2 TIMES DAILY
Qty: 14 TABLET | Refills: 0 | Status: SHIPPED | OUTPATIENT
Start: 2022-06-26 | End: 2022-07-03

## 2022-06-26 RX ADMIN — SULFAMETHOXAZOLE AND TRIMETHOPRIM 1 TABLET: 800; 160 TABLET ORAL at 16:23

## 2022-06-26 NOTE — ED PROVIDER NOTES
Jose Osborn is a 79yo F with swelling and redness to her right ankle and UTI. She was seen at [facility] ED 3 days ago and diagnosed with UTI and prescribed keflex. Today she was contacted and advised the her Urine culture was growing resistant bacteria. Her daughter noted that her ankle had increased redness and so she was advised to come to the ED for evaluation. She had Hip fracture that had surgical repair at the end of April. She was discharged to rehab at the beginning of May and during the 1st week there she had an US of her leg to evaluate for DVT which was normal but she has been having ankle swelling and reddness on and off since. She has not had fever or chills.              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    AV block, 1st degree     Back problem     Constipation 11/18/2020    Edema of lower extremity 11/18/2020    Headache     Hearing loss     Herpes     Hyperlipidemia     Hypertension     Incomplete rectal prolapse 11/18/2020    Joint pain     Osteoarthritis of both knees 11/18/2020    Sleep disorder     Insomnia    Thyroid disease        Past Surgical History:   Procedure Laterality Date    HX ACL RECONSTRUCTION      HX CATARACT REMOVAL      HX ORTHOPAEDIC      knee replacment 1n 1986 & Feb 2015         Family History:   Problem Relation Age of Onset    Heart Disease Mother     Heart Disease Father        Social History     Socioeconomic History    Marital status:      Spouse name: Not on file    Number of children: Not on file    Years of education: Not on file    Highest education level: Not on file   Occupational History    Not on file   Tobacco Use    Smoking status: Never Smoker    Smokeless tobacco: Never Used   Vaping Use    Vaping Use: Never used   Substance and Sexual Activity    Alcohol use: No    Drug use: No    Sexual activity: Not on file   Other Topics Concern    Not on file   Social History Narrative    Not on file     Social Determinants of Health     Financial Resource Strain:     Difficulty of Paying Living Expenses: Not on file   Food Insecurity:     Worried About Running Out of Food in the Last Year: Not on file    Anne of Food in the Last Year: Not on file   Transportation Needs:     Lack of Transportation (Medical): Not on file    Lack of Transportation (Non-Medical): Not on file   Physical Activity:     Days of Exercise per Week: Not on file    Minutes of Exercise per Session: Not on file   Stress:     Feeling of Stress : Not on file   Social Connections:     Frequency of Communication with Friends and Family: Not on file    Frequency of Social Gatherings with Friends and Family: Not on file    Attends Denominational Services: Not on file    Active Member of [address] [place] [address] [employer] or Organizations: Not on file    Attends Club or Organization Meetings: Not on file    Marital Status: Not on file   Intimate Partner Violence:     Fear of Current or Ex-Partner: Not on file    Emotionally Abused: Not on file    Physically Abused: Not on file    Sexually Abused: Not on file   Housing Stability:     Unable to Pay for Housing in the Last Year: Not on file    Number of [place] in the Last Year: Not on file    Unstable Housing in the Last Year: Not on file         ALLERGIES: Patient has no known allergies. Review of Systems   Constitutional: Negative for fever. HENT: Negative for sore throat. Eyes: Negative for visual disturbance. Respiratory: Negative for cough. Cardiovascular: Positive for leg swelling. Negative for chest pain. Gastrointestinal: Negative for abdominal pain. Genitourinary: Positive for dysuria. Musculoskeletal: Negative for back pain. Skin: Positive for color change. Negative for rash. Neurological: Negative for headaches.        Vitals:    06/26/22 1419   BP: (!) 146/57   Pulse: 67   Resp: 18   Temp: 98.8 °F (37.1 °C)   SpO2: 96%   Weight: 85.7 kg (189 lb)   Height: 5' 1\" (1.549 m)            Physical Exam  Vitals and nursing note reviewed. Constitutional:       General: She is not in acute distress. Appearance: She is well-developed. HENT:      Head: Normocephalic and atraumatic. Eyes:      Conjunctiva/sclera: Conjunctivae normal.   Neck:      Trachea: Phonation normal.   Cardiovascular:      Rate and Rhythm: Normal rate. Pulmonary:      Effort: Pulmonary effort is normal. No respiratory distress. Abdominal:      General: There is no distension. Musculoskeletal:         General: No tenderness. Normal range of motion. Cervical back: Normal range of motion. Right lower leg: Edema present. Left lower leg: Edema present. Skin:     General: Skin is warm and dry. Findings: Erythema present. Rash: mild, left ankle. Neurological:      Mental Status: She is alert. She is not disoriented. Motor: No abnormal muscle tone. MDM         3:17 PM  Urine culture reviewed and significant for ESBL Ecoli sensitive to Cipro, Levaquin, Macrobid and Bactrim. Will treat with bactrim to also cover for possible mild cellulitis. Vascular US also ordered to eval for DVT. 5:10 PM  Vascular US negative for DVT. WBC 6.1, stable from prior. Lactic acid normal.  Updated patient and family on results. Prescription for Bactrim sent to pharmacy.      Procedures

## 2022-06-26 NOTE — ED NOTES
12:40 PM  I received a call from the lab that patient is growing ESBL in the urine. I attempted to reach the patient and inform her of her results. I left a voicemail for her to please call back the emergency department as soon as possible. 12:50 PM  I spoke with patient's ananya Garcia. Patient is still complaining of back pain. She may also have a cellulitis. I informed her to return to the ED for reevaluation and antibiotics.     Donovan Collazo MD

## 2022-06-26 NOTE — ED TRIAGE NOTES
Pt reports she was seen at [facility] ED this past Thursday and was treated for a UTI. They report they were referred here today for a positive urine culture. Denies complaints or pain. Denies fevers.

## 2022-07-01 LAB
BACTERIA SPEC CULT: NORMAL
SERVICE CMNT-IMP: NORMAL

## 2022-07-12 ENCOUNTER — OFFICE VISIT (OUTPATIENT)
Dept: PRIMARY CARE CLINIC | Age: 87
End: 2022-07-12
Payer: MEDICARE

## 2022-07-12 ENCOUNTER — TELEPHONE (OUTPATIENT)
Dept: PRIMARY CARE CLINIC | Age: 87
End: 2022-07-12

## 2022-07-12 VITALS
RESPIRATION RATE: 18 BRPM | TEMPERATURE: 97.1 F | HEART RATE: 59 BPM | WEIGHT: 188 LBS | SYSTOLIC BLOOD PRESSURE: 122 MMHG | BODY MASS INDEX: 35.5 KG/M2 | HEIGHT: 61 IN | DIASTOLIC BLOOD PRESSURE: 66 MMHG

## 2022-07-12 DIAGNOSIS — N39.0 E. COLI UTI: ICD-10-CM

## 2022-07-12 DIAGNOSIS — B96.20 E. COLI UTI: ICD-10-CM

## 2022-07-12 DIAGNOSIS — Z16.12: ICD-10-CM

## 2022-07-12 DIAGNOSIS — M62.830 BACK SPASM: Primary | ICD-10-CM

## 2022-07-12 LAB
BACTERIA UA POCT, BACTPOCT: NORMAL
BILIRUB UR QL STRIP: NEGATIVE
CASTS UA POCT: NORMAL
CLUE CELLS, CLUEPOCT: NORMAL
CRYSTALS UA POCT, CRYSPOCT: NORMAL
EPITHELIAL CELLS POCT: NORMAL
GLUCOSE UR-MCNC: NEGATIVE MG/DL
KETONES P FAST UR STRIP-MCNC: NEGATIVE MG/DL
MUCUS UA POCT, MUCPOCT: NORMAL
PH UR STRIP: 5.5 [PH] (ref 4.6–8)
PROT UR QL STRIP: NEGATIVE
RBC UA POCT, RBCPOCT: NORMAL
SP GR UR STRIP: 1 (ref 1–1.03)
TRICH UA POCT, TRICHPOC: NORMAL
UA UROBILINOGEN AMB POC: NORMAL (ref 0.2–1)
URINALYSIS CLARITY POC: CLEAR
URINALYSIS COLOR POC: YELLOW
URINE BLOOD POC: NEGATIVE
URINE CULT COMMENT, POCT: NORMAL
URINE LEUKOCYTES POC: NEGATIVE
URINE NITRITES POC: NEGATIVE
WBC UA POCT, WBCPOCT: NORMAL
YEAST UA POCT, YEASTPOC: NORMAL

## 2022-07-12 PROCEDURE — G8536 NO DOC ELDER MAL SCRN: HCPCS | Performed by: NURSE PRACTITIONER

## 2022-07-12 PROCEDURE — G8427 DOCREV CUR MEDS BY ELIG CLIN: HCPCS | Performed by: NURSE PRACTITIONER

## 2022-07-12 PROCEDURE — G8432 DEP SCR NOT DOC, RNG: HCPCS | Performed by: NURSE PRACTITIONER

## 2022-07-12 PROCEDURE — 1123F ACP DISCUSS/DSCN MKR DOCD: CPT | Performed by: NURSE PRACTITIONER

## 2022-07-12 PROCEDURE — 99213 OFFICE O/P EST LOW 20 MIN: CPT | Performed by: NURSE PRACTITIONER

## 2022-07-12 PROCEDURE — 1101F PT FALLS ASSESS-DOCD LE1/YR: CPT | Performed by: NURSE PRACTITIONER

## 2022-07-12 PROCEDURE — 81001 URINALYSIS AUTO W/SCOPE: CPT | Performed by: NURSE PRACTITIONER

## 2022-07-12 PROCEDURE — 1090F PRES/ABSN URINE INCON ASSESS: CPT | Performed by: NURSE PRACTITIONER

## 2022-07-12 PROCEDURE — G8417 CALC BMI ABV UP PARAM F/U: HCPCS | Performed by: NURSE PRACTITIONER

## 2022-07-12 PROCEDURE — 96372 THER/PROPH/DIAG INJ SC/IM: CPT | Performed by: NURSE PRACTITIONER

## 2022-07-12 RX ORDER — KETOROLAC TROMETHAMINE 30 MG/ML
60 INJECTION, SOLUTION INTRAMUSCULAR; INTRAVENOUS ONCE
Status: COMPLETED | OUTPATIENT
Start: 2022-07-12 | End: 2022-07-12

## 2022-07-12 RX ORDER — GABAPENTIN 100 MG/1
CAPSULE ORAL
COMMUNITY
Start: 2022-06-14 | End: 2022-08-17

## 2022-07-12 RX ADMIN — KETOROLAC TROMETHAMINE 60 MG: 30 INJECTION, SOLUTION INTRAMUSCULAR; INTRAVENOUS at 14:29

## 2022-07-12 NOTE — PROGRESS NOTES
Chief Complaint   Patient presents with    UTI     Pt states having back pain. pt states being seeing somewhere else and was told she had a uti. pt wants to make sure that its cleared up. Pt states urine has odor and back pain. pt states pulling muscle in back by going up the stairs        1. Have you been to the ER, urgent care clinic since your last visit? Hospitalized since your last visit?no    2. Have you seen or consulted any other health care providers outside of the [address] [place] [address] since your last visit? Include any pap smears or colon screening.  no    Visit Vitals  /66 (BP 1 Location: Right arm, BP Patient Position: At rest, BP Cuff Size: Adult)   Pulse (!) 59   Temp 97.1 °F (36.2 °C) (Temporal)   Resp 18   Ht 5' 1\" (1.549 m)   Wt 188 lb (85.3 kg)   BMI 35.52 kg/m²

## 2022-07-12 NOTE — PROGRESS NOTES
HISTORY OF PRESENT ILLNESS  Bakari Gonsalves is a 80 y.o. female presents for UTI and back pain. Patient was seen in the ER on 6/26 and dx with UTI. Urine culture grew E.coli that was resistant to antibiotics except for cipro and bactrim. Given 7 day course of bactrim by ER. Patient reported that she felt better after the antibiotics until 3 days ago when she started to experience back pain and foul smelling urine. Patient is concerned that she is having pulling in her back. Thinks she injured back going up the stairs. Family member, Violeta Ro, reported since thinks back pain is related to infection. Vitals:    07/12/22 1356   BP: 122/66   Pulse: (!) 59   Resp: 18   Temp: 97.1 °F (36.2 °C)   TempSrc: Temporal   Weight: 188 lb (85.3 kg)   Height: 5' 1\" (1.549 m)     Patient Active Problem List   Diagnosis Code    Cellulitis and abscess of leg L03.119, L02.419    Right hip pain M25.551    Hyperlipidemia E78.5    Hypothyroidism E03.9    Severe obesity (BMI 35.0-39. 9) with comorbidity ([address] E66.01    Back problem M53.9    AV block, 1st degree I44.0    Hypertension I10    Herpes B00.9    Benign hypertensive CKD, stage 1-4 or unspecified chronic kidney disease I12.9    Cellulitis of right lower extremity L03.115    Stage 3b chronic kidney disease (HCC) N18.32    Constipation K59.00    Edema of lower extremity R60.0    Atrioventricular block I44.30    Incomplete rectal prolapse K62.3    Osteoarthritis of both knees M17.0    Paronychia of finger of left hand L03.012     Patient Active Problem List    Diagnosis Date Noted    Constipation 11/18/2020    Edema of lower extremity 11/18/2020    Atrioventricular block 11/18/2020    Incomplete rectal prolapse 11/18/2020    Osteoarthritis of both knees 11/18/2020    Paronychia of finger of left hand 11/18/2020    Cellulitis of right lower extremity 11/10/2020    Stage 3b chronic kidney disease ([address] 11/10/2020    Benign hypertensive CKD, stage 1-4 or unspecified chronic kidney disease 10/29/2020    Back problem     AV block, 1st degree     Hypertension     Herpes     Severe obesity (BMI 35.0-39. 9) with comorbidity ([facility] 03/22/2018    Cellulitis and abscess of leg 01/14/2013    Right hip pain 01/14/2013    Hyperlipidemia 01/14/2013    Hypothyroidism 01/14/2013     Current Outpatient Medications   Medication Sig Dispense Refill    gabapentin (NEURONTIN) 100 mg capsule TAKE 1 CAPSULE BY MOUTH AT BEDTIME      MELATONIN PO Take  by mouth.  atorvastatin (LIPITOR) 20 mg tablet TAKE 1 TABLET BY MOUTH EVERY EVENING FOR HIGH CHOLESTEROL 90 Tablet 0    levothyroxine (SYNTHROID) 88 mcg tablet TAKE 1 TABLET BY MOUTH DAILY 90 Tablet 0    furosemide (LASIX) 40 mg tablet Take one tablet daily as needed for leg swelling. 60 Tablet 1    diclofenac (VOLTAREN) 1 % gel Apply 4 g to affected area every six (6) hours. 200 g 5     No Known Allergies  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    AV block, 1st degree     Back problem     Constipation 11/18/2020    Edema of lower extremity 11/18/2020    Headache     Hearing loss     Herpes     Hyperlipidemia     Hypertension     Incomplete rectal prolapse 11/18/2020    Joint pain     Osteoarthritis of both knees 11/18/2020    Sleep disorder     Insomnia    Thyroid disease      Past Surgical History:   Procedure Laterality Date    HX ACL RECONSTRUCTION      HX CATARACT REMOVAL      HX HIP REPLACEMENT  04/2022    right hip    HX ORTHOPAEDIC      knee replacment 1n 1986 & Feb 2015     Family History   Problem Relation Age of Onset    Heart Disease Mother     Heart Disease Father      Social History     Tobacco Use    Smoking status: Never Smoker    Smokeless tobacco: Never Used   Substance Use Topics    Alcohol use: No           Review of Systems   Constitutional: Negative for chills and fever.    Gastrointestinal: Negative for abdominal pain, nausea and vomiting. Genitourinary: Negative for dysuria, flank pain, frequency, hematuria and urgency. Musculoskeletal: Positive for back pain. Skin: Negative for itching and rash. Physical Exam  Constitutional:       Appearance: Normal appearance. HENT:      Head: Normocephalic. Eyes:      Conjunctiva/sclera: Conjunctivae normal.   Cardiovascular:      Rate and Rhythm: Normal rate and regular rhythm. Pulmonary:      Effort: Pulmonary effort is normal.   Musculoskeletal:      Lumbar back: Tenderness present. Back:    Skin:     General: Skin is warm and dry. Neurological:      Mental Status: She is alert and oriented to person, place, and time. Psychiatric:         Mood and Affect: Mood normal.         Behavior: Behavior normal.           ASSESSMENT and PLAN  Diagnoses and all orders for this visit:    1. Back spasm  Comments:  toradol injection to help with back pain. Continue gabapentin at home. Orders:  -     ketorolac tromethamine (TORADOL) 60 mg/2 mL injection 60 mg    2. E. coli UTI  Comments:  UA normal today but will send off culture.    Orders:  -     AMB POC URINALYSIS DIP STICK AUTO W/ MICRO   -     CULTURE, URINE         Sebastien Hurd NP

## 2022-07-12 NOTE — TELEPHONE ENCOUNTER
Would like to see her for an appointment so we can recheck urine and culture as she grew abnormal bacteria in last culture.      Can work her in today at 2 pm.

## 2022-07-12 NOTE — TELEPHONE ENCOUNTER
Patient was seen at the ER on 6/26/22 and given a strong antibiotic for a UTI. She has finished the medication and it cleared it up for a little while but now all symptoms are returning. Patient is having pain, chills and strong smelling urine. Family is asking if we would prescribe another round of antibiotics for patient and for a return call.

## 2022-07-14 ENCOUNTER — TELEPHONE (OUTPATIENT)
Dept: PRIMARY CARE CLINIC | Age: 87
End: 2022-07-14

## 2022-07-14 DIAGNOSIS — G89.29 CHRONIC BILATERAL LOW BACK PAIN WITHOUT SCIATICA: Primary | ICD-10-CM

## 2022-07-14 DIAGNOSIS — M54.50 CHRONIC BILATERAL LOW BACK PAIN WITHOUT SCIATICA: Primary | ICD-10-CM

## 2022-07-14 RX ORDER — CYCLOBENZAPRINE HCL 5 MG
5 TABLET ORAL
Qty: 10 TABLET | Refills: 0 | Status: SHIPPED | OUTPATIENT
Start: 2022-07-14 | End: 2022-07-25 | Stop reason: ALTCHOICE

## 2022-07-14 RX ORDER — LIDOCAINE 50 MG/G
PATCH TOPICAL
Qty: 1 EACH | Refills: 0 | Status: SHIPPED | OUTPATIENT
Start: 2022-07-14

## 2022-07-14 NOTE — TELEPHONE ENCOUNTER
Pt granddaughter informed. And is asking if something else can be prescribed for the back pain. Pt granddaughter states pt is miserable and unable to sleep.

## 2022-07-14 NOTE — TELEPHONE ENCOUNTER
Pt's granddaughter has called asking if have reviewed labs. Stated that pt has been up all night and seems to have gotten worst. Would like for you to give her a call.

## 2022-07-14 NOTE — TELEPHONE ENCOUNTER
The urine culture takes about 72 hours to result so we do not have a result yet. Is her back pain getting worse?

## 2022-07-14 NOTE — TELEPHONE ENCOUNTER
Sent in flexeril to use only at bedtime as this will make her drowsy but hopefully will help her sleep with the pain. Lidocaine patches also sent in to use during the day.

## 2022-07-17 LAB
BACTERIA UR CULT: ABNORMAL
BACTERIA UR CULT: ABNORMAL

## 2022-07-18 RX ORDER — CIPROFLOXACIN 500 MG/1
500 TABLET ORAL 2 TIMES DAILY
Qty: 28 TABLET | Refills: 0 | Status: SHIPPED | OUTPATIENT
Start: 2022-07-18 | End: 2022-08-01

## 2022-07-20 ENCOUNTER — OFFICE VISIT (OUTPATIENT)
Dept: PRIMARY CARE CLINIC | Age: 87
End: 2022-07-20
Payer: MEDICARE

## 2022-07-20 VITALS
SYSTOLIC BLOOD PRESSURE: 121 MMHG | DIASTOLIC BLOOD PRESSURE: 46 MMHG | BODY MASS INDEX: 36.21 KG/M2 | OXYGEN SATURATION: 98 % | HEART RATE: 63 BPM | WEIGHT: 191.8 LBS | HEIGHT: 61 IN | RESPIRATION RATE: 18 BRPM

## 2022-07-20 DIAGNOSIS — G47.00 INSOMNIA, UNSPECIFIED TYPE: Primary | ICD-10-CM

## 2022-07-20 DIAGNOSIS — F41.9 ANXIETY: ICD-10-CM

## 2022-07-20 PROCEDURE — 1123F ACP DISCUSS/DSCN MKR DOCD: CPT | Performed by: FAMILY MEDICINE

## 2022-07-20 PROCEDURE — 99215 OFFICE O/P EST HI 40 MIN: CPT | Performed by: FAMILY MEDICINE

## 2022-07-20 RX ORDER — FLUVOXAMINE MALEATE 25 MG/1
25 TABLET ORAL
Qty: 30 TABLET | Refills: 1 | Status: SHIPPED | OUTPATIENT
Start: 2022-07-20 | End: 2022-08-17

## 2022-07-20 NOTE — PROGRESS NOTES
Ronen Marks (: 1931) is a 80 y.o. female, established patient, here for evaluation of the following chief complaint(s): Other (Not sleeping at HS) and Anxiety       ASSESSMENT/PLAN:  Below is the assessment and plan developed based on review of pertinent history, physical exam, labs, studies, and medications. 1. Insomnia, unspecified type  Chronic  -     fluvoxaMINE (LUVOX) 25 mg tablet; Take 1 Tablet by mouth nightly., Normal, Disp-30 Tablet, R-1  Patient to stop melatonin, Luvox 25 mg q. nightly, medication warnings discussed. Will avoid prescribing benzodiazepines and Ambien and similar medications given age. 2. Anxiety  Chronic  -     fluvoxaMINE (LUVOX) 25 mg tablet; Take 1 Tablet by mouth nightly., Normal, Disp-30 Tablet, R-1      Return if symptoms worsen or fail to improve. SUBJECTIVE/OBJECTIVE:  HPI    27-year-old female history hypothyroidism, hyperlipidemia, osteoarthritis, status post hip replacement presents to the office for chief complaint of sleep difficulty. Patient states that she has not been getting enough sleep, she averages 3 hours of sleep every night, waking up early. Does take daytime naps. Patient states that she has been thinking about a lot of things, she is without pain, she does endorse anxiety, no depression. Sleep troubles worse after her hip surgery. Able to sleep with higher dose of gabapentin (200 mg). No Known Allergies  Current Outpatient Medications   Medication Sig    fluvoxaMINE (LUVOX) 25 mg tablet Take 1 Tablet by mouth nightly. ciprofloxacin HCl (CIPRO) 500 mg tablet Take 1 Tablet by mouth two (2) times a day for 14 days. lidocaine (LIDODERM) 5 % Apply patch to the affected area for 12 hours a day and remove for 12 hours a day.    gabapentin (NEURONTIN) 100 mg capsule TAKE 1 CAPSULE BY MOUTH AT BEDTIME    MELATONIN PO Take  by mouth.     atorvastatin (LIPITOR) 20 mg tablet TAKE 1 TABLET BY MOUTH EVERY EVENING FOR HIGH CHOLESTEROL levothyroxine (SYNTHROID) 88 mcg tablet TAKE 1 TABLET BY MOUTH DAILY    furosemide (LASIX) 40 mg tablet Take one tablet daily as needed for leg swelling. diclofenac (VOLTAREN) 1 % gel Apply 4 g to affected area every six (6) hours. cyclobenzaprine (FLEXERIL) 5 mg tablet Take 1 Tablet by mouth nightly as needed for Muscle Spasm(s). (Patient not taking: Reported on 7/20/2022)     No current facility-administered medications for this visit. Past Medical History:   Diagnosis Date    Arthritis     Atrioventricular block 11/18/2020    AV block, 1st degree     Back problem     Constipation 11/18/2020    Edema of lower extremity 11/18/2020    Headache     Hearing loss     Herpes     Hyperlipidemia     Hypertension     Incomplete rectal prolapse 11/18/2020    Joint pain     Osteoarthritis of both knees 11/18/2020    Sleep disorder     Insomnia    Thyroid disease      Past Surgical History:   Procedure Laterality Date    HX ACL RECONSTRUCTION      HX CATARACT REMOVAL      HX HIP REPLACEMENT  04/2022    right hip    HX ORTHOPAEDIC      knee replacment 1n 1986 & Feb 2015     Family History   Problem Relation Age of Onset    Heart Disease Mother     Heart Disease Father      Social History     Tobacco Use   Smoking Status Never   Smokeless Tobacco Never         Review of Systems   All other systems reviewed and are negative. BP (!) 121/46 (BP 1 Location: Right upper arm, BP Patient Position: Sitting, BP Cuff Size: Adult)   Pulse 63   Resp 18   Ht 5' 1\" (1.549 m)   Wt 191 lb 12.8 oz (87 kg)   SpO2 98%   BMI 36.24 kg/m²    Physical Exam  Vitals reviewed. Constitutional:       Appearance: Normal appearance. Cardiovascular:      Rate and Rhythm: Normal rate and regular rhythm. Pulses: Normal pulses. Heart sounds: Normal heart sounds. Pulmonary:      Effort: Pulmonary effort is normal.      Breath sounds: Normal breath sounds. Musculoskeletal:      Cervical back: Neck supple.    Skin: General: Skin is warm. Neurological:      General: No focal deficit present. Mental Status: She is alert. Psychiatric:         Mood and Affect: Mood normal.           On this date 07/20/2022 I have spent 45 minutes reviewing previous notes, test results and face to face with the patient discussing the diagnosis and importance of compliance with the treatment plan as well as documenting on the day of the visit. An electronic signature was used to authenticate this note.   -- Patricia Jiang MD   [facility] [address]  [address]

## 2022-07-20 NOTE — PROGRESS NOTES
Chief Complaint   Patient presents with    Other     Not sleeping at     Anxiety     Visit Vitals  BP (!) 121/46 (BP 1 Location: Right upper arm, BP Patient Position: Sitting, BP Cuff Size: Adult)   Pulse 63   Resp 18   Ht 5' 1\" (1.549 m)   Wt 191 lb 12.8 oz (87 kg)   SpO2 98%   BMI 36.24 kg/m²     1. \"Have you been to the ER, urgent care clinic since your last visit? Hospitalized since your last visit? \" Yes When: Last week     2. \"Have you seen or consulted any other health care providers outside of the [address] [place] [address] since your last visit? \" No     3. For patients aged 39-70: Has the patient had a colonoscopy / FIT/ Cologuard? No      If the patient is female:    4. For patients aged 41-77: Has the patient had a mammogram within the past 2 years? No      5. For patients aged 21-65: Has the patient had a pap smear?  No

## 2022-07-21 ENCOUNTER — TELEPHONE (OUTPATIENT)
Dept: PRIMARY CARE CLINIC | Age: 87
End: 2022-07-21

## 2022-07-21 NOTE — TELEPHONE ENCOUNTER
It will take a few days for the infections (2 different bacterias) to start to clear. The antibiotics take a little time to work.

## 2022-07-21 NOTE — TELEPHONE ENCOUNTER
This is regarding the sleep medication dr Denny Bran sent in. Pt granddaughter is not florentino gonzales that called its her granddaughter Donna Chao [phone] is the number. Pt granddaughter states she was up all night, she thinks it may be anxiety related. Pt is refusing to taking medication.  Granddaughter would like to know what to do

## 2022-07-21 NOTE — TELEPHONE ENCOUNTER
Pt granddaughter called saying she took new med last night and she was up all night, she thought it would be instant help- or is it going to take time for adjustments please call [phone]

## 2022-07-22 ENCOUNTER — TELEPHONE (OUTPATIENT)
Dept: PRIMARY CARE CLINIC | Age: 87
End: 2022-07-22

## 2022-07-22 NOTE — TELEPHONE ENCOUNTER
Pts granddaughter called asking for a refill on Do's Gabapentin 100 mg. It was prescribed by a surgeon. She takes them at night and is asking if theres any way to send in a refill to get her through the weekend. Also said she spoke with Kelly Jones regarding the issue.

## 2022-07-26 ENCOUNTER — TELEPHONE (OUTPATIENT)
Dept: PRIMARY CARE CLINIC | Age: 87
End: 2022-07-26

## 2022-07-26 NOTE — TELEPHONE ENCOUNTER
Spoke to patient's granddaughter, she said patient was up the entire night and she's really concerned. Would medication have an adverse effect? Patient stated the medication made her feel anxious and she will not take another pill. Patient's granddaughter [address] asking if there is anything more natural that can be prescribed and would the medication cause her grandmother to be up the entire night?

## 2022-07-26 NOTE — TELEPHONE ENCOUNTER
Pt is taking medication for insomnia. The first night she took it she was awake the entire night. She didn't like the way it made her feel at all. Asking about side effects. Granddaughter is concerned, she doesn't want to force her to take the medication. Asking for advice.

## 2022-07-26 NOTE — TELEPHONE ENCOUNTER
Spoke to patient's granddaughter, made aware of recommendation, will talk to her grandmother to see if she is willing to try another medication or therapy. Will inform us if patient decides to try any of the alternatives.

## 2022-08-11 ENCOUNTER — TELEPHONE (OUTPATIENT)
Dept: PRIMARY CARE CLINIC | Age: 87
End: 2022-08-11

## 2022-08-17 ENCOUNTER — HOSPITAL ENCOUNTER (EMERGENCY)
Age: 87
Discharge: HOME | End: 2022-08-17
Attending: STUDENT IN AN ORGANIZED HEALTH CARE EDUCATION/TRAINING PROGRAM
Payer: MEDICARE

## 2022-08-17 VITALS
HEIGHT: 61 IN | WEIGHT: 188 LBS | SYSTOLIC BLOOD PRESSURE: 136 MMHG | RESPIRATION RATE: 20 BRPM | DIASTOLIC BLOOD PRESSURE: 45 MMHG | TEMPERATURE: 98.9 F | BODY MASS INDEX: 35.5 KG/M2 | OXYGEN SATURATION: 94 % | HEART RATE: 63 BPM

## 2022-08-17 DIAGNOSIS — R60.0 BILATERAL EDEMA OF LOWER EXTREMITY: ICD-10-CM

## 2022-08-17 DIAGNOSIS — I87.2 VENOUS STASIS DERMATITIS OF BOTH LOWER EXTREMITIES: Primary | ICD-10-CM

## 2022-08-17 DIAGNOSIS — M79.2 NEUROPATHIC PAIN: ICD-10-CM

## 2022-08-17 PROCEDURE — 99283 EMERGENCY DEPT VISIT LOW MDM: CPT

## 2022-08-17 PROCEDURE — 74011250637 HC RX REV CODE- 250/637: Performed by: STUDENT IN AN ORGANIZED HEALTH CARE EDUCATION/TRAINING PROGRAM

## 2022-08-17 RX ORDER — GABAPENTIN 100 MG/1
100 CAPSULE ORAL 3 TIMES DAILY
Qty: 90 CAPSULE | Refills: 0 | Status: SHIPPED | OUTPATIENT
Start: 2022-08-17 | End: 2022-08-22 | Stop reason: SDUPTHER

## 2022-08-17 RX ORDER — GABAPENTIN 100 MG/1
100 CAPSULE ORAL
Status: COMPLETED | OUTPATIENT
Start: 2022-08-17 | End: 2022-08-17

## 2022-08-17 RX ORDER — TRIAMCINOLONE ACETONIDE 1 MG/G
OINTMENT TOPICAL 2 TIMES DAILY
Qty: 30 G | Refills: 0 | Status: SHIPPED | OUTPATIENT
Start: 2022-08-17

## 2022-08-17 RX ADMIN — GABAPENTIN 100 MG: 100 CAPSULE ORAL at 13:03

## 2022-08-17 NOTE — DISCHARGE INSTRUCTIONS
You presented to the ED with several months of intermittent worsening leg swelling. This is most consistent with a dependent edema and venous stasis dermatitis. For the itching you may apply triamcinolone ointment twice a day to your legs. You around 100 mg of gabapentin at night. I have increased her dose to 3 times a day. You will have to be diligent about wrapping your legs and change in your compression stockings. During the day when your leg swelling starts to worsen with stockings on rest and put your feet up in the area to reduce swelling. Then you may back go back to activities when the swelling subsides. Please follow-up with your PCP.

## 2022-08-17 NOTE — ED TRIAGE NOTES
Pt reports to ER ambulatory with walker assistance accompanied by daughter with cc of bilateral leg swelling, redness and pain. Reports she has had a follow up appointment scheduled with her primary care for 2 weeks to address her leg swelling which was cancelled this morning so she came to ER. Reports she has been unable to sleep at night so she sits in a chair majority of night putting together puzzles and the pain seems to be worse at night. Stated she only gets short of breath when walking but not while sitting.

## 2022-08-17 NOTE — ED NOTES
Pt given discharge instructions, patient education, 2 prescriptions, and follow up information. Pt verbalizes understanding. All questions answered. Patient discharged to home in private vehicle, ambulatory. Pt A&Ox4, RA, pain controlled.

## 2022-08-17 NOTE — ED PROVIDER NOTES
Patient is a 72-year-old female presented to ED with bilateral lower extremity swelling, pain, erythema and itching in a circumferential manner. Patient's symptoms have been on and off for the last several months. Patient denies any shortness of breath or chest pain. Patient's pain has been so grated admits to sleeping difficulty at night. Patient does use a compression stocking intermittently but the swelling becomes so great that the stockings cause significant pain. Patient described 100 mg of gabapentin at night but no other pain medications. The history is provided by the patient and a relative. Leg Pain   This is a chronic problem. The current episode started more than 1 week ago. The problem occurs constantly. The problem has been gradually worsening. The pain is present in the left lower leg and right lower leg. The quality of the pain is described as aching, dull and constant. The pain is at a severity of 5/10. The pain is moderate. Associated symptoms include itching. Pertinent negatives include no numbness, full range of motion and no stiffness. The symptoms are aggravated by standing and palpation. She has tried rest for the symptoms. The treatment provided mild relief. There has been no history of extremity trauma.       Past Medical History:   Diagnosis Date    Arthritis     Atrioventricular block 11/18/2020    AV block, 1st degree     Back problem     Constipation 11/18/2020    Edema of lower extremity 11/18/2020    Headache     Hearing loss     Herpes     Hyperlipidemia     Hypertension     Incomplete rectal prolapse 11/18/2020    Joint pain     Osteoarthritis of both knees 11/18/2020    Sleep disorder     Insomnia    Thyroid disease        Past Surgical History:   Procedure Laterality Date    HX ACL RECONSTRUCTION      HX CATARACT REMOVAL      HX HIP REPLACEMENT  04/2022    right hip    HX ORTHOPAEDIC      knee replacment 1n 1986 & Feb 2015         Family History:   Problem Relation Age of Onset    Heart Disease Mother     Heart Disease Father        Social History     Socioeconomic History    Marital status:      Spouse name: Not on file    Number of children: Not on file    Years of education: Not on file    Highest education level: Not on file   Occupational History    Not on file   Tobacco Use    Smoking status: Never    Smokeless tobacco: Never   Vaping Use    Vaping Use: Never used   Substance and Sexual Activity    Alcohol use: No    Drug use: No    Sexual activity: Not on file   Other Topics Concern    Not on file   Social History Narrative    Not on file     Social Determinants of Health     Financial Resource Strain: Not on file   Food Insecurity: Not on file   Transportation Needs: Not on file   Physical Activity: Not on file   Stress: Not on file   Social Connections: Not on file   Intimate Partner Violence: Not on file   Housing Stability: Not on file         ALLERGIES: Patient has no known allergies. Review of Systems   Constitutional:  Positive for activity change. HENT: Negative. Eyes: Negative. Respiratory: Negative. Cardiovascular:  Positive for leg swelling. Gastrointestinal: Negative. Endocrine: Negative. Genitourinary: Negative. Musculoskeletal: Negative. Negative for stiffness. Skin:  Positive for color change and itching. Allergic/Immunologic: Negative. Neurological: Negative. Negative for numbness. Hematological: Negative. Psychiatric/Behavioral: Negative. Vitals:    08/17/22 1212 08/17/22 1220 08/17/22 1227 08/17/22 1242   BP: (!) 154/55  (!) 124/55 (!) 136/45   Pulse: 63      Resp: 20      Temp: 98.9 °F (37.2 °C)      SpO2: 97% 96% 97% 94%   Weight: 85.3 kg (188 lb)      Height: 5' 1\" (1.549 m)               Physical Exam  Vitals and nursing note reviewed. Constitutional:       General: She is not in acute distress. Appearance: Normal appearance. HENT:      Head: Normocephalic and atraumatic.       Right Ear: External ear normal.      Left Ear: External ear normal.      Nose: Nose normal.   Eyes:      Extraocular Movements: Extraocular movements intact. Conjunctiva/sclera: Conjunctivae normal.   Cardiovascular:      Rate and Rhythm: Normal rate. Pulses: Normal pulses. Radial pulses are 2+ on the right side and 2+ on the left side. Heart sounds: Normal heart sounds. Pulmonary:      Effort: Pulmonary effort is normal.      Breath sounds: Normal breath sounds. Chest:      Chest wall: No deformity or tenderness. Abdominal:      General: Abdomen is flat. There is no distension. Tenderness: There is no abdominal tenderness. Musculoskeletal:         General: Swelling and tenderness present. No deformity or signs of injury. Normal range of motion. Cervical back: Normal range of motion and neck supple. No tenderness. Comments: Patient has bilateral lower extremity swelling with some pitting edema, erythema on the right is most consistent with venous stasis as well as dependent edema. No signs of cellulitis or superinfection. Skin:     General: Skin is warm and dry. Capillary Refill: Capillary refill takes less than 2 seconds. Findings: Erythema present. Neurological:      General: No focal deficit present. Mental Status: She is alert and oriented to person, place, and time. Psychiatric:         Attention and Perception: Attention normal.         Mood and Affect: Mood normal.         Behavior: Behavior normal.        MDM         MEDICATIONS GIVEN:  Medications   gabapentin (NEURONTIN) capsule 100 mg (100 mg Oral Given 8/17/22 1303)       Differential diagnosis: Dependent edema, venous stasis dermatitis, leg swelling, cellulitis    MDM: Patient is a 72-year-old female presented to the ED with bilateral equal leg swelling present for several months with significant pain.   Patient's symptoms are certainly painful but has been present for more than 2 months and most likely is a chronic problem most likely dependent edema with venous stasis pain and itching. Patient has tried compression stockings. Gabapentin. Patient has neuropathic pain in her feet as well. I have empathy for this patient but my ability to resolve the symptoms the ED is limited. Discussed treatment with daughter including increasing gabapentin, topical steroids for the stasis dermatitis, graduated compression stocking use and close follow-up with PCP. Patient's family stated understanding. Further personalized recommendations for outpatient care as below. Key discharge instructions and summary of care: You presented to the ED with several months of intermittent worsening leg swelling. This is most consistent with a dependent edema and venous stasis dermatitis. For the itching you may apply triamcinolone ointment twice a day to your legs. You around 100 mg of gabapentin at night. I have increased her dose to 3 times a day. You will have to be diligent about wrapping your legs and change in your compression stockings. During the day when your leg swelling starts to worsen with stockings on rest and put your feet up in the area to reduce swelling. Then you may back go back to activities when the swelling subsides. Please follow-up with your PCP. Patient is stable for discharge. All available radiology and laboratory results have been reviewed with patient and/or available family. Patient and/or family verbally conveyed their understanding and agreement of the patient's signs, symptoms, diagnosis, treatment and prognosis and additionally agree to follow-up as recommended in the discharge instructions or to return to the Emergency Department should their condition change or worsen prior acute or dangerous to the patient's health. Follow-up appointment. All questions have been answered and patient and/or available family express understanding. IMPRESSION:  1.  Venous stasis dermatitis of both lower extremities    2. Bilateral edema of lower extremity    3. Neuropathic pain        DISPOSITION: Discharged     Rashel Olivo MD      Procedures

## 2022-08-22 ENCOUNTER — OFFICE VISIT (OUTPATIENT)
Dept: PRIMARY CARE CLINIC | Age: 87
End: 2022-08-22
Payer: MEDICARE

## 2022-08-22 VITALS
SYSTOLIC BLOOD PRESSURE: 136 MMHG | HEART RATE: 67 BPM | OXYGEN SATURATION: 95 % | BODY MASS INDEX: 35.68 KG/M2 | DIASTOLIC BLOOD PRESSURE: 62 MMHG | WEIGHT: 189 LBS | RESPIRATION RATE: 18 BRPM | TEMPERATURE: 97.2 F | HEIGHT: 61 IN

## 2022-08-22 DIAGNOSIS — E78.5 HYPERLIPIDEMIA LDL GOAL <100: Chronic | ICD-10-CM

## 2022-08-22 DIAGNOSIS — I87.8 VENOUS STASIS: ICD-10-CM

## 2022-08-22 DIAGNOSIS — M79.2 NEUROPATHIC PAIN: ICD-10-CM

## 2022-08-22 DIAGNOSIS — E03.9 HYPOTHYROIDISM, UNSPECIFIED TYPE: Chronic | ICD-10-CM

## 2022-08-22 DIAGNOSIS — N18.32 STAGE 3B CHRONIC KIDNEY DISEASE (HCC): Primary | ICD-10-CM

## 2022-08-22 PROCEDURE — 99214 OFFICE O/P EST MOD 30 MIN: CPT | Performed by: NURSE PRACTITIONER

## 2022-08-22 PROCEDURE — 1101F PT FALLS ASSESS-DOCD LE1/YR: CPT | Performed by: NURSE PRACTITIONER

## 2022-08-22 PROCEDURE — G8417 CALC BMI ABV UP PARAM F/U: HCPCS | Performed by: NURSE PRACTITIONER

## 2022-08-22 PROCEDURE — 1123F ACP DISCUSS/DSCN MKR DOCD: CPT | Performed by: NURSE PRACTITIONER

## 2022-08-22 PROCEDURE — G8427 DOCREV CUR MEDS BY ELIG CLIN: HCPCS | Performed by: NURSE PRACTITIONER

## 2022-08-22 PROCEDURE — 1090F PRES/ABSN URINE INCON ASSESS: CPT | Performed by: NURSE PRACTITIONER

## 2022-08-22 PROCEDURE — G8536 NO DOC ELDER MAL SCRN: HCPCS | Performed by: NURSE PRACTITIONER

## 2022-08-22 PROCEDURE — G8432 DEP SCR NOT DOC, RNG: HCPCS | Performed by: NURSE PRACTITIONER

## 2022-08-22 RX ORDER — GABAPENTIN 100 MG/1
100 CAPSULE ORAL
Qty: 90 CAPSULE | Refills: 5 | Status: SHIPPED | OUTPATIENT
Start: 2022-08-22 | End: 2022-09-21

## 2022-08-22 RX ORDER — LEVOTHYROXINE SODIUM 88 UG/1
88 TABLET ORAL DAILY
Qty: 90 TABLET | Refills: 3 | Status: SHIPPED | OUTPATIENT
Start: 2022-08-22

## 2022-08-22 NOTE — PROGRESS NOTES
Chief Complaint   Patient presents with    Swelling     Pt states her feet her swelling. Pt states left is worse the right. Pt states she elevates her feet. Pt is also asking for 6 month bw       1. Have you been to the ER, urgent care clinic since your last visit? Hospitalized since your last visit? yes for leg swelling     2. Have you seen or consulted any other health care providers outside of the [address] [place] [address] since your last visit? Include any pap smears or colon screening.  No        Visit Vitals  /62 (BP 1 Location: Right arm, BP Patient Position: At rest, BP Cuff Size: Adult)   Pulse 67   Temp 97.2 °F (36.2 °C) (Temporal)   Resp 18   Ht 5' 1\" (1.549 m)   Wt 189 lb (85.7 kg)   SpO2 95%   BMI 35.71 kg/m²

## 2022-08-22 NOTE — PROGRESS NOTES
HISTORY OF PRESENT ILLNESS  Nannette Duvall is a 80 y.o. female presents for leg swelling and foot pain. Leg Swelling: Patient c/o bilateral leg swelling that is chronic. Wearing compression stockings and using lasix daily. Patient curious why she continues to have swelling. Patient went to ER last visit, given gabapentin for neuropathy pain in her feet. Patient given gabapentin with relief in pain, would like a refill. Hypothyroidism:  Patient has been using levothyroxine 88 mcg with good control over hypothyroidism. Denies weight changes, palpitations, fatigue or trouble sleeping. Hyperlipidemia: Mixed hyperlipidemia well controlled on atorvastatin. Denies any leg cramps or malaise from this medication. Reported compliance with taking medication daily. Vitals:    08/22/22 1138   BP: 136/62   Pulse: 67   Resp: 18   Temp: 97.2 °F (36.2 °C)   TempSrc: Temporal   SpO2: 95%   Weight: 189 lb (85.7 kg)   Height: 5' 1\" (1.549 m)     Patient Active Problem List   Diagnosis Code    Cellulitis and abscess of leg L03.119, L02.419    Right hip pain M25.551    Hyperlipidemia E78.5    Hypothyroidism E03.9    Severe obesity (BMI 35.0-39. 9) with comorbidity ([facility] [address] E66.01    Back problem M53.9    AV block, 1st degree I44.0    Hypertension I10    Herpes B00.9    Benign hypertensive CKD, stage 1-4 or unspecified chronic kidney disease I12.9    Cellulitis of right lower extremity L03.115    Stage 3b chronic kidney disease (HCC) N18.32    Constipation K59.00    Edema of lower extremity R60.0    Atrioventricular block I44.30    Incomplete rectal prolapse K62.3    Osteoarthritis of both knees M17.0    Paronychia of finger of left hand L03.012    Insomnia G47.00    Anxiety F41.9     Patient Active Problem List    Diagnosis Date Noted    Insomnia 07/20/2022    Anxiety 07/20/2022    Constipation 11/18/2020    Edema of lower extremity 11/18/2020    Atrioventricular block 11/18/2020    Incomplete rectal prolapse 11/18/2020    Osteoarthritis of both knees 11/18/2020    Paronychia of finger of left hand 11/18/2020    Cellulitis of right lower extremity 11/10/2020    Stage 3b chronic kidney disease ([facility] [address] 11/10/2020    Benign hypertensive CKD, stage 1-4 or unspecified chronic kidney disease 10/29/2020    Back problem     AV block, 1st degree     Hypertension     Herpes     Severe obesity (BMI 35.0-39. 9) with comorbidity ([facility] [address] 03/22/2018    Cellulitis and abscess of leg 01/14/2013    Right hip pain 01/14/2013    Hyperlipidemia 01/14/2013    Hypothyroidism 01/14/2013     Current Outpatient Medications   Medication Sig Dispense Refill    levothyroxine (SYNTHROID) 88 mcg tablet Take 1 Tablet by mouth daily. 90 Tablet 3    gabapentin (NEURONTIN) 100 mg capsule Take 1 Capsule by mouth three (3) times daily as needed for Pain for up to 30 days. Max Daily Amount: 300 mg. 90 Capsule 5    triamcinolone acetonide (KENALOG) 0.1 % ointment Apply  to affected area two (2) times a day. use thin layer 30 g 0    lidocaine (LIDODERM) 5 % Apply patch to the affected area for 12 hours a day and remove for 12 hours a day. 1 Each 0    MELATONIN PO Take  by mouth. atorvastatin (LIPITOR) 20 mg tablet TAKE 1 TABLET BY MOUTH EVERY EVENING FOR HIGH CHOLESTEROL 90 Tablet 0    furosemide (LASIX) 40 mg tablet Take one tablet daily as needed for leg swelling. 60 Tablet 1    diclofenac (VOLTAREN) 1 % gel Apply 4 g to affected area every six (6) hours.  200 g 5     No Known Allergies  Past Medical History:   Diagnosis Date    Arthritis     Atrioventricular block 11/18/2020    AV block, 1st degree     Back problem     Constipation 11/18/2020    Edema of lower extremity 11/18/2020    Headache     Hearing loss     Herpes     Hyperlipidemia     Hypertension     Incomplete rectal prolapse 11/18/2020    Joint pain     Osteoarthritis of both knees 11/18/2020    Sleep disorder     Insomnia    Thyroid disease      Past Surgical History:   Procedure Laterality Date HX ACL RECONSTRUCTION      HX CATARACT REMOVAL      HX HIP REPLACEMENT  2022    right hip    HX ORTHOPAEDIC      knee replacment 1n  & 2015     Family History   Problem Relation Age of Onset    Heart Disease Mother     Heart Disease Father      Social History     Tobacco Use    Smoking status: Never    Smokeless tobacco: Never   Substance Use Topics    Alcohol use: No           Review of Systems   Constitutional:  Negative for malaise/fatigue and weight loss. Eyes:  Negative for blurred vision and double vision. Respiratory:  Negative for shortness of breath. Cardiovascular:  Positive for leg swelling. Negative for chest pain and palpitations. Gastrointestinal: Negative. Genitourinary: Negative. Skin:  Negative for itching and rash. Neurological:  Positive for sensory change (neuropathy in feet). Negative for dizziness, tingling, tremors and headaches. Psychiatric/Behavioral:  The patient is not nervous/anxious and does not have insomnia. Physical Exam  Constitutional:       Appearance: Normal appearance. She is obese. Comments: Using a walker today     HENT:      Head: Normocephalic. Eyes:      Extraocular Movements: Extraocular movements intact. Conjunctiva/sclera: Conjunctivae normal.      Pupils: Pupils are equal, round, and reactive to light. Cardiovascular:      Rate and Rhythm: Normal rate and regular rhythm. Pulses: Normal pulses. Heart sounds: Normal heart sounds. Pulmonary:      Effort: Pulmonary effort is normal.      Breath sounds: Normal breath sounds. Musculoskeletal:         General: Normal range of motion. Cervical back: Normal range of motion and neck supple. Right lower le+ Pitting Edema present. Left lower le+ Pitting Edema present. Skin:     General: Skin is warm and dry. Neurological:      General: No focal deficit present. Mental Status: She is alert and oriented to person, place, and time. Psychiatric:         Mood and Affect: Mood normal.         Behavior: Behavior normal.         ASSESSMENT and PLAN  Diagnoses and all orders for this visit:    1. Stage 3b chronic kidney disease ([facility] [address]  Comments:  monitoring kidney function today    2. Hypothyroidism, unspecified type  Comments:  well controlled on current dose of synthroid. Orders:  -     levothyroxine (SYNTHROID) 88 mcg tablet; Take 1 Tablet by mouth daily.  -     TSH 3RD GENERATION    3. Neuropathic pain  Comments:  continue on gabapentin. Twice daily is working for now. Orders:  -     gabapentin (NEURONTIN) 100 mg capsule; Take 1 Capsule by mouth three (3) times daily as needed for Pain for up to 30 days. Max Daily Amount: 300 mg.  -     CBC WITH AUTOMATED DIFF  -     METABOLIC PANEL, COMPREHENSIVE    4. Hyperlipidemia LDL goal <100  Comments:  Discussed potentially stopping statin today. Will check labs. No hx of CAD or CVA. Orders:  -     LIPID PANEL    5. Venous stasis  Comments:  discussed how and why she is having leg swelling. Continue to use treatments. Red flag symptoms discussed (redness, warmth, pain).         Sissy Aiken NP

## 2022-08-23 LAB
ALBUMIN SERPL-MCNC: 4.4 G/DL (ref 3.5–4.6)
ALBUMIN/GLOB SERPL: 1.6 {RATIO} (ref 1.2–2.2)
ALP SERPL-CCNC: 96 IU/L (ref 44–121)
ALT SERPL-CCNC: 8 IU/L (ref 0–32)
AST SERPL-CCNC: 15 IU/L (ref 0–40)
BASOPHILS # BLD AUTO: 0 X10E3/UL (ref 0–0.2)
BASOPHILS NFR BLD AUTO: 1 %
BILIRUB SERPL-MCNC: 0.3 MG/DL (ref 0–1.2)
BUN SERPL-MCNC: 29 MG/DL (ref 10–36)
BUN/CREAT SERPL: 20 (ref 12–28)
CALCIUM SERPL-MCNC: 9.5 MG/DL (ref 8.7–10.3)
CHLORIDE SERPL-SCNC: 100 MMOL/L (ref 96–106)
CHOLEST SERPL-MCNC: 153 MG/DL (ref 100–199)
CO2 SERPL-SCNC: 22 MMOL/L (ref 20–29)
CREAT SERPL-MCNC: 1.44 MG/DL (ref 0.57–1)
EGFR: 34 ML/MIN/1.73
EOSINOPHIL # BLD AUTO: 0.4 X10E3/UL (ref 0–0.4)
EOSINOPHIL NFR BLD AUTO: 7 %
ERYTHROCYTE [DISTWIDTH] IN BLOOD BY AUTOMATED COUNT: 13.4 % (ref 11.7–15.4)
GLOBULIN SER CALC-MCNC: 2.7 G/DL (ref 1.5–4.5)
GLUCOSE SERPL-MCNC: 97 MG/DL (ref 65–99)
HCT VFR BLD AUTO: 32 % (ref 34–46.6)
HDLC SERPL-MCNC: 50 MG/DL
HGB BLD-MCNC: 10.1 G/DL (ref 11.1–15.9)
IMM GRANULOCYTES # BLD AUTO: 0 X10E3/UL (ref 0–0.1)
IMM GRANULOCYTES NFR BLD AUTO: 0 %
LDLC SERPL CALC-MCNC: 84 MG/DL (ref 0–99)
LYMPHOCYTES # BLD AUTO: 1.4 X10E3/UL (ref 0.7–3.1)
LYMPHOCYTES NFR BLD AUTO: 25 %
MCH RBC QN AUTO: 26.5 PG (ref 26.6–33)
MCHC RBC AUTO-ENTMCNC: 31.6 G/DL (ref 31.5–35.7)
MCV RBC AUTO: 84 FL (ref 79–97)
MONOCYTES # BLD AUTO: 0.6 X10E3/UL (ref 0.1–0.9)
MONOCYTES NFR BLD AUTO: 11 %
NEUTROPHILS # BLD AUTO: 3.1 X10E3/UL (ref 1.4–7)
NEUTROPHILS NFR BLD AUTO: 56 %
PLATELET # BLD AUTO: 252 X10E3/UL (ref 150–450)
POTASSIUM SERPL-SCNC: 4.6 MMOL/L (ref 3.5–5.2)
PROT SERPL-MCNC: 7.1 G/DL (ref 6–8.5)
RBC # BLD AUTO: 3.81 X10E6/UL (ref 3.77–5.28)
SODIUM SERPL-SCNC: 143 MMOL/L (ref 134–144)
TRIGL SERPL-MCNC: 102 MG/DL (ref 0–149)
TSH SERPL DL<=0.005 MIU/L-ACNC: 2.78 UIU/ML (ref 0.45–4.5)
VLDLC SERPL CALC-MCNC: 19 MG/DL (ref 5–40)
WBC # BLD AUTO: 5.5 X10E3/UL (ref 3.4–10.8)

## 2022-08-23 NOTE — PROGRESS NOTES
Kidney function is at baseline, anemia is improving since last labs. Thyroid function looks normal.     Cholesterol levels are well controlled. I would like for her to stop her cholesterol medication like discussed.

## 2022-09-29 ENCOUNTER — TELEPHONE (OUTPATIENT)
Dept: PRIMARY CARE CLINIC | Age: 87
End: 2022-09-29

## 2022-09-29 DIAGNOSIS — E78.5 HYPERLIPIDEMIA LDL GOAL <100: Chronic | ICD-10-CM

## 2022-09-29 RX ORDER — GABAPENTIN 100 MG/1
100 CAPSULE ORAL
COMMUNITY

## 2022-09-29 RX ORDER — RAMIPRIL 2.5 MG/1
2.5 CAPSULE ORAL DAILY
COMMUNITY
Start: 2022-08-09

## 2022-09-29 NOTE — TELEPHONE ENCOUNTER
[facility] CLINICAL PHARMACY: ADHERENCE REVIEW  Identified care gap per [employer]: fills at [facility]: ACE/ARB and Statin adherence    Last Visit: 8/22/2022      ASSESSMENT  ACE/ARB ADHERENCE    Insurance Records claims through 9/29/2022 (2021 [place] = NA%; YTD PDC =  100%; Potential Fail Date: 2023 ):   Ramipril last filled on 8/5/2022 for 90 day supply. Next refill due: 11/3/2022    Listed as not a current medication and discontinued on pt profile? ?  BP Readings from Last 3 Encounters:   08/22/22 136/62   08/17/22 (!) 136/45   07/20/22 (!) 121/46     Estimated Creatinine Clearance: 25.3 mL/min (A) (by C-G formula based on SCr of 1.44 mg/dL (H)). [id number] Records claims through 9/29/2022 (2021 [place] = NA%; YTD [place] =  77%; Potential Fail Date: 10/9/2022 ):   Atorvastatin last filled on 5/10/2022 for 90 day supply. Next refill due: 8/8/2022-PAST DUE 52 days   0 refills remaining. Billed through [employer]    Per OV 8/22/2022 with Aury Osorio:  4. Hyperlipidemia LDL goal <100  Comments:  Discussed potentially stopping statin today. Will check labs. No hx of CAD or CVA. Orders:  -     LIPID PANEL    From Labs:  Kidney function is at baseline, anemia is improving since last labs. Thyroid function looks normal.      Cholesterol levels are well controlled. I would like for her to stop her cholesterol medication like discussed. Lab Results   Component Value Date/Time    Cholesterol, total 153 08/22/2022 12:08 PM    HDL Cholesterol 50 08/22/2022 12:08 PM    LDL, calculated 84 08/22/2022 12:08 PM    VLDL, calculated 19 08/22/2022 12:08 PM    Triglyceride 102 08/22/2022 12:08 PM     ALT (SGPT)   Date Value Ref Range Status   08/22/2022 8 0 - 32 IU/L Final     AST (SGOT)   Date Value Ref Range Status   08/22/2022 15 0 - 40 IU/L Final     The ASCVD Risk score (Queenie DK, et al., 2019) failed to calculate for the following reasons:     The 2019 ASCVD risk score is only valid for ages 36 to 78 PLAN  The following are interventions that have been identified:  - Patient needs refills for Atorvastatin  Patient has been filling ramipril- has not a current medication in chart? Pt went to ER for bilateral LLE  Patient also takes gabapentin 100 mg at bedtime   Ramipril 2.5 mg she said uses them for breathing     Patient has stopped taking Atorvastatin per last appointment they discussed stopping   No future appointments.     Sindhu Bloom, PharmD, [address] [facility]   Department, toll free: [phone] Option 2  ================================================================================  For Pharmacy Admin Tracking Only      Gap Closed?: No    Time Spent (min): 20

## 2023-01-13 ENCOUNTER — TELEPHONE (OUTPATIENT)
Dept: PRIMARY CARE CLINIC | Age: 88
End: 2023-01-13

## 2023-01-18 DIAGNOSIS — R60.9 PERIPHERAL EDEMA: ICD-10-CM

## 2023-01-18 RX ORDER — FUROSEMIDE 40 MG/1
TABLET ORAL
Qty: 60 TABLET | Refills: 0 | Status: SHIPPED | OUTPATIENT
Start: 2023-01-18

## 2023-01-18 NOTE — TELEPHONE ENCOUNTER
Pt has been scheduled to be seen in office for med refills.  She is asking for medication to get her to her appointment day

## 2023-03-16 DIAGNOSIS — R60.9 PERIPHERAL EDEMA: ICD-10-CM

## 2023-03-16 RX ORDER — FUROSEMIDE 40 MG/1
TABLET ORAL
Qty: 60 TABLET | Refills: 0 | Status: SHIPPED | OUTPATIENT
Start: 2023-03-16

## 2023-05-03 ENCOUNTER — OFFICE VISIT (OUTPATIENT)
Dept: PRIMARY CARE CLINIC | Age: 88
End: 2023-05-03
Payer: MEDICARE

## 2023-05-03 VITALS
OXYGEN SATURATION: 98 % | DIASTOLIC BLOOD PRESSURE: 60 MMHG | RESPIRATION RATE: 20 BRPM | HEART RATE: 67 BPM | SYSTOLIC BLOOD PRESSURE: 120 MMHG | WEIGHT: 166.4 LBS | HEIGHT: 61 IN | BODY MASS INDEX: 31.42 KG/M2 | TEMPERATURE: 96.9 F

## 2023-05-03 DIAGNOSIS — R60.9 PERIPHERAL EDEMA: ICD-10-CM

## 2023-05-03 DIAGNOSIS — N18.32 STAGE 3B CHRONIC KIDNEY DISEASE (HCC): ICD-10-CM

## 2023-05-03 DIAGNOSIS — L29.9 ITCHING: ICD-10-CM

## 2023-05-03 DIAGNOSIS — G89.29 CHRONIC BILATERAL LOW BACK PAIN WITHOUT SCIATICA: ICD-10-CM

## 2023-05-03 DIAGNOSIS — E03.9 HYPOTHYROIDISM, UNSPECIFIED TYPE: Chronic | ICD-10-CM

## 2023-05-03 DIAGNOSIS — G47.9 SLEEP TROUBLE: ICD-10-CM

## 2023-05-03 DIAGNOSIS — M54.50 CHRONIC BILATERAL LOW BACK PAIN WITHOUT SCIATICA: ICD-10-CM

## 2023-05-03 DIAGNOSIS — Z00.00 MEDICARE ANNUAL WELLNESS VISIT, SUBSEQUENT: Primary | ICD-10-CM

## 2023-05-03 DIAGNOSIS — E03.9 ACQUIRED HYPOTHYROIDISM: ICD-10-CM

## 2023-05-03 DIAGNOSIS — L85.3 DRY SKIN: ICD-10-CM

## 2023-05-03 PROCEDURE — G8432 DEP SCR NOT DOC, RNG: HCPCS | Performed by: NURSE PRACTITIONER

## 2023-05-03 PROCEDURE — G8417 CALC BMI ABV UP PARAM F/U: HCPCS | Performed by: NURSE PRACTITIONER

## 2023-05-03 PROCEDURE — G8536 NO DOC ELDER MAL SCRN: HCPCS | Performed by: NURSE PRACTITIONER

## 2023-05-03 PROCEDURE — 1090F PRES/ABSN URINE INCON ASSESS: CPT | Performed by: NURSE PRACTITIONER

## 2023-05-03 PROCEDURE — 1101F PT FALLS ASSESS-DOCD LE1/YR: CPT | Performed by: NURSE PRACTITIONER

## 2023-05-03 PROCEDURE — 1123F ACP DISCUSS/DSCN MKR DOCD: CPT | Performed by: NURSE PRACTITIONER

## 2023-05-03 PROCEDURE — G8427 DOCREV CUR MEDS BY ELIG CLIN: HCPCS | Performed by: NURSE PRACTITIONER

## 2023-05-03 PROCEDURE — G0439 PPPS, SUBSEQ VISIT: HCPCS | Performed by: NURSE PRACTITIONER

## 2023-05-03 PROCEDURE — 99214 OFFICE O/P EST MOD 30 MIN: CPT | Performed by: NURSE PRACTITIONER

## 2023-05-03 RX ORDER — HYDROXYZINE PAMOATE 25 MG/1
25 CAPSULE ORAL
Qty: 30 CAPSULE | Refills: 0 | Status: SHIPPED | OUTPATIENT
Start: 2023-05-03

## 2023-05-03 RX ORDER — LEVOTHYROXINE SODIUM 88 UG/1
88 TABLET ORAL DAILY
Qty: 90 TABLET | Refills: 3 | Status: SHIPPED | OUTPATIENT
Start: 2023-05-03

## 2023-05-03 RX ORDER — FUROSEMIDE 20 MG/1
20 TABLET ORAL
Qty: 60 TABLET | Refills: 1 | Status: SHIPPED | OUTPATIENT
Start: 2023-05-03

## 2023-05-03 RX ORDER — AMMONIUM LACTATE 12 G/100G
CREAM TOPICAL 2 TIMES DAILY
Qty: 280 G | Refills: 0 | Status: SHIPPED | OUTPATIENT
Start: 2023-05-03

## 2023-05-04 LAB
ALBUMIN SERPL-MCNC: 4.1 G/DL (ref 3.5–4.6)
ALBUMIN/GLOB SERPL: 1.3 {RATIO} (ref 1.2–2.2)
ALP SERPL-CCNC: 79 IU/L (ref 44–121)
ALT SERPL-CCNC: 8 IU/L (ref 0–32)
AST SERPL-CCNC: 17 IU/L (ref 0–40)
BASOPHILS # BLD AUTO: 0 X10E3/UL (ref 0–0.2)
BASOPHILS NFR BLD AUTO: 1 %
BILIRUB SERPL-MCNC: 0.3 MG/DL (ref 0–1.2)
BUN SERPL-MCNC: 32 MG/DL (ref 10–36)
BUN/CREAT SERPL: 18 (ref 12–28)
CALCIUM SERPL-MCNC: 9.3 MG/DL (ref 8.7–10.3)
CHLORIDE SERPL-SCNC: 102 MMOL/L (ref 96–106)
CO2 SERPL-SCNC: 20 MMOL/L (ref 20–29)
CREAT SERPL-MCNC: 1.76 MG/DL (ref 0.57–1)
EGFRCR SERPLBLD CKD-EPI 2021: 27 ML/MIN/1.73
EOSINOPHIL # BLD AUTO: 0.2 X10E3/UL (ref 0–0.4)
EOSINOPHIL NFR BLD AUTO: 5 %
ERYTHROCYTE [DISTWIDTH] IN BLOOD BY AUTOMATED COUNT: 14.8 % (ref 11.7–15.4)
GLOBULIN SER CALC-MCNC: 3.1 G/DL (ref 1.5–4.5)
GLUCOSE SERPL-MCNC: 105 MG/DL (ref 70–99)
HCT VFR BLD AUTO: 32.7 % (ref 34–46.6)
HGB BLD-MCNC: 10.8 G/DL (ref 11.1–15.9)
IMM GRANULOCYTES # BLD AUTO: 0 X10E3/UL (ref 0–0.1)
IMM GRANULOCYTES NFR BLD AUTO: 1 %
LYMPHOCYTES # BLD AUTO: 1.1 X10E3/UL (ref 0.7–3.1)
LYMPHOCYTES NFR BLD AUTO: 26 %
MCH RBC QN AUTO: 29.6 PG (ref 26.6–33)
MCHC RBC AUTO-ENTMCNC: 33 G/DL (ref 31.5–35.7)
MCV RBC AUTO: 90 FL (ref 79–97)
MONOCYTES # BLD AUTO: 0.4 X10E3/UL (ref 0.1–0.9)
MONOCYTES NFR BLD AUTO: 9 %
NEUTROPHILS # BLD AUTO: 2.4 X10E3/UL (ref 1.4–7)
NEUTROPHILS NFR BLD AUTO: 58 %
PLATELET # BLD AUTO: 222 X10E3/UL (ref 150–450)
POTASSIUM SERPL-SCNC: 4.6 MMOL/L (ref 3.5–5.2)
PROT SERPL-MCNC: 7.2 G/DL (ref 6–8.5)
RBC # BLD AUTO: 3.65 X10E6/UL (ref 3.77–5.28)
SODIUM SERPL-SCNC: 139 MMOL/L (ref 134–144)
TSH SERPL DL<=0.005 MIU/L-ACNC: 2.43 UIU/ML (ref 0.45–4.5)
WBC # BLD AUTO: 4.1 X10E3/UL (ref 3.4–10.8)

## 2023-07-05 RX ORDER — HYDROXYZINE PAMOATE 25 MG/1
CAPSULE ORAL
Qty: 30 CAPSULE | Refills: 1 | Status: SHIPPED | OUTPATIENT
Start: 2023-07-05

## 2023-08-03 RX ORDER — LEVOTHYROXINE SODIUM 88 UG/1
TABLET ORAL
Qty: 90 TABLET | Refills: 3 | Status: SHIPPED | OUTPATIENT
Start: 2023-08-03

## 2023-08-06 NOTE — TELEPHONE ENCOUNTER
Only a 30 day refill was done, please schedule appt for patient. Offer a virtual visit. If he can't do it, see if someone can assist her.  Either way she needs an appt Opt out

## 2023-09-05 RX ORDER — HYDROXYZINE PAMOATE 25 MG/1
CAPSULE ORAL
Qty: 30 CAPSULE | Refills: 1 | Status: SHIPPED | OUTPATIENT
Start: 2023-09-05

## 2023-09-08 RX ORDER — FUROSEMIDE 20 MG/1
TABLET ORAL
Qty: 60 TABLET | Refills: 2 | Status: SHIPPED | OUTPATIENT
Start: 2023-09-08

## 2023-09-20 ENCOUNTER — OFFICE VISIT (OUTPATIENT)
Dept: PRIMARY CARE CLINIC | Facility: CLINIC | Age: 88
End: 2023-09-20
Payer: MEDICARE

## 2023-09-20 VITALS
HEART RATE: 84 BPM | TEMPERATURE: 97.6 F | WEIGHT: 160.4 LBS | SYSTOLIC BLOOD PRESSURE: 132 MMHG | RESPIRATION RATE: 18 BRPM | BODY MASS INDEX: 30.29 KG/M2 | HEIGHT: 61 IN | DIASTOLIC BLOOD PRESSURE: 89 MMHG

## 2023-09-20 DIAGNOSIS — R00.2 PALPITATIONS: ICD-10-CM

## 2023-09-20 DIAGNOSIS — Z91.81 AT HIGH RISK FOR FALLS: ICD-10-CM

## 2023-09-20 DIAGNOSIS — N18.32 CHRONIC KIDNEY DISEASE, STAGE 3B (HCC): Primary | Chronic | ICD-10-CM

## 2023-09-20 DIAGNOSIS — E03.9 ACQUIRED HYPOTHYROIDISM: Chronic | ICD-10-CM

## 2023-09-20 PROCEDURE — 1123F ACP DISCUSS/DSCN MKR DOCD: CPT | Performed by: NURSE PRACTITIONER

## 2023-09-20 PROCEDURE — 93000 ELECTROCARDIOGRAM COMPLETE: CPT | Performed by: NURSE PRACTITIONER

## 2023-09-20 PROCEDURE — 99214 OFFICE O/P EST MOD 30 MIN: CPT | Performed by: NURSE PRACTITIONER

## 2023-09-20 SDOH — ECONOMIC STABILITY: HOUSING INSECURITY
IN THE LAST 12 MONTHS, WAS THERE A TIME WHEN YOU DID NOT HAVE A STEADY PLACE TO SLEEP OR SLEPT IN A SHELTER (INCLUDING NOW)?: NO

## 2023-09-20 SDOH — ECONOMIC STABILITY: FOOD INSECURITY: WITHIN THE PAST 12 MONTHS, THE FOOD YOU BOUGHT JUST DIDN'T LAST AND YOU DIDN'T HAVE MONEY TO GET MORE.: NEVER TRUE

## 2023-09-20 SDOH — ECONOMIC STABILITY: INCOME INSECURITY: HOW HARD IS IT FOR YOU TO PAY FOR THE VERY BASICS LIKE FOOD, HOUSING, MEDICAL CARE, AND HEATING?: NOT HARD AT ALL

## 2023-09-20 SDOH — ECONOMIC STABILITY: FOOD INSECURITY: WITHIN THE PAST 12 MONTHS, YOU WORRIED THAT YOUR FOOD WOULD RUN OUT BEFORE YOU GOT MONEY TO BUY MORE.: NEVER TRUE

## 2023-09-20 ASSESSMENT — ENCOUNTER SYMPTOMS
COUGH: 0
GASTROINTESTINAL NEGATIVE: 1
WHEEZING: 0
SHORTNESS OF BREATH: 1
CHEST TIGHTNESS: 0

## 2023-09-20 ASSESSMENT — PATIENT HEALTH QUESTIONNAIRE - PHQ9
SUM OF ALL RESPONSES TO PHQ QUESTIONS 1-9: 0
SUM OF ALL RESPONSES TO PHQ QUESTIONS 1-9: 0
SUM OF ALL RESPONSES TO PHQ9 QUESTIONS 1 & 2: 0
SUM OF ALL RESPONSES TO PHQ QUESTIONS 1-9: 0
SUM OF ALL RESPONSES TO PHQ QUESTIONS 1-9: 0
2. FEELING DOWN, DEPRESSED OR HOPELESS: 0
1. LITTLE INTEREST OR PLEASURE IN DOING THINGS: 0

## 2023-09-21 LAB
ALBUMIN SERPL-MCNC: 3.9 G/DL (ref 3.6–4.6)
ALBUMIN/GLOB SERPL: 1.4 {RATIO} (ref 1.2–2.2)
ALP SERPL-CCNC: 78 IU/L (ref 44–121)
ALT SERPL-CCNC: 8 IU/L (ref 0–32)
AST SERPL-CCNC: 14 IU/L (ref 0–40)
BILIRUB SERPL-MCNC: 0.4 MG/DL (ref 0–1.2)
BUN SERPL-MCNC: 26 MG/DL (ref 10–36)
BUN/CREAT SERPL: 15 (ref 12–28)
CALCIUM SERPL-MCNC: 9 MG/DL (ref 8.7–10.3)
CHLORIDE SERPL-SCNC: 106 MMOL/L (ref 96–106)
CO2 SERPL-SCNC: 22 MMOL/L (ref 20–29)
CREAT SERPL-MCNC: 1.7 MG/DL (ref 0.57–1)
EGFRCR SERPLBLD CKD-EPI 2021: 28 ML/MIN/1.73
ERYTHROCYTE [DISTWIDTH] IN BLOOD BY AUTOMATED COUNT: 14.2 % (ref 11.7–15.4)
GLOBULIN SER CALC-MCNC: 2.7 G/DL (ref 1.5–4.5)
GLUCOSE SERPL-MCNC: 102 MG/DL (ref 70–99)
HCT VFR BLD AUTO: 31 % (ref 34–46.6)
HGB BLD-MCNC: 10 G/DL (ref 11.1–15.9)
MCH RBC QN AUTO: 28.7 PG (ref 26.6–33)
MCHC RBC AUTO-ENTMCNC: 32.3 G/DL (ref 31.5–35.7)
MCV RBC AUTO: 89 FL (ref 79–97)
PLATELET # BLD AUTO: 211 X10E3/UL (ref 150–450)
POTASSIUM SERPL-SCNC: 4.3 MMOL/L (ref 3.5–5.2)
PROT SERPL-MCNC: 6.6 G/DL (ref 6–8.5)
RBC # BLD AUTO: 3.49 X10E6/UL (ref 3.77–5.28)
SODIUM SERPL-SCNC: 141 MMOL/L (ref 134–144)
TSH SERPL DL<=0.005 MIU/L-ACNC: 2.06 UIU/ML (ref 0.45–4.5)
WBC # BLD AUTO: 4.2 X10E3/UL (ref 3.4–10.8)

## 2023-09-29 ENCOUNTER — TELEPHONE (OUTPATIENT)
Dept: PRIMARY CARE CLINIC | Facility: CLINIC | Age: 88
End: 2023-09-29

## 2023-10-04 RX ORDER — AMMONIUM LACTATE 12 G/100G
CREAM TOPICAL
Qty: 280 G | Refills: 1 | Status: SHIPPED | OUTPATIENT
Start: 2023-10-04

## 2024-03-13 ENCOUNTER — OFFICE VISIT (OUTPATIENT)
Dept: PRIMARY CARE CLINIC | Facility: CLINIC | Age: 89
End: 2024-03-13
Payer: MEDICARE

## 2024-03-13 VITALS
DIASTOLIC BLOOD PRESSURE: 58 MMHG | WEIGHT: 157.6 LBS | HEIGHT: 61 IN | HEART RATE: 61 BPM | SYSTOLIC BLOOD PRESSURE: 138 MMHG | BODY MASS INDEX: 29.76 KG/M2 | TEMPERATURE: 98.6 F

## 2024-03-13 DIAGNOSIS — R53.83 FATIGUE, UNSPECIFIED TYPE: ICD-10-CM

## 2024-03-13 DIAGNOSIS — E03.9 ACQUIRED HYPOTHYROIDISM: ICD-10-CM

## 2024-03-13 DIAGNOSIS — M54.9 MID BACK PAIN: ICD-10-CM

## 2024-03-13 DIAGNOSIS — N30.00 ACUTE CYSTITIS WITHOUT HEMATURIA: Primary | ICD-10-CM

## 2024-03-13 LAB
BILIRUBIN, URINE, POC: NEGATIVE
BLOOD URINE, POC: ABNORMAL
GLUCOSE URINE, POC: NEGATIVE
KETONES, URINE, POC: NEGATIVE
LEUKOCYTE ESTERASE, URINE, POC: ABNORMAL
NITRITE, URINE, POC: POSITIVE
PH, URINE, POC: 6 (ref 4.6–8)
PROTEIN,URINE, POC: 30
SPECIFIC GRAVITY, URINE, POC: 1.02 (ref 1–1.03)
URINALYSIS CLARITY, POC: ABNORMAL
URINALYSIS COLOR, POC: YELLOW
UROBILINOGEN, POC: ABNORMAL

## 2024-03-13 PROCEDURE — 99214 OFFICE O/P EST MOD 30 MIN: CPT | Performed by: NURSE PRACTITIONER

## 2024-03-13 PROCEDURE — 81001 URINALYSIS AUTO W/SCOPE: CPT | Performed by: NURSE PRACTITIONER

## 2024-03-13 PROCEDURE — 1123F ACP DISCUSS/DSCN MKR DOCD: CPT | Performed by: NURSE PRACTITIONER

## 2024-03-13 RX ORDER — CEPHALEXIN 500 MG/1
500 CAPSULE ORAL 2 TIMES DAILY
Qty: 14 CAPSULE | Refills: 0 | Status: SHIPPED | OUTPATIENT
Start: 2024-03-13 | End: 2024-03-20

## 2024-03-13 SDOH — ECONOMIC STABILITY: FOOD INSECURITY: WITHIN THE PAST 12 MONTHS, THE FOOD YOU BOUGHT JUST DIDN'T LAST AND YOU DIDN'T HAVE MONEY TO GET MORE.: NEVER TRUE

## 2024-03-13 SDOH — ECONOMIC STABILITY: INCOME INSECURITY: HOW HARD IS IT FOR YOU TO PAY FOR THE VERY BASICS LIKE FOOD, HOUSING, MEDICAL CARE, AND HEATING?: NOT HARD AT ALL

## 2024-03-13 SDOH — ECONOMIC STABILITY: FOOD INSECURITY: WITHIN THE PAST 12 MONTHS, YOU WORRIED THAT YOUR FOOD WOULD RUN OUT BEFORE YOU GOT MONEY TO BUY MORE.: NEVER TRUE

## 2024-03-13 ASSESSMENT — ENCOUNTER SYMPTOMS
SHORTNESS OF BREATH: 0
BACK PAIN: 1

## 2024-03-13 ASSESSMENT — PATIENT HEALTH QUESTIONNAIRE - PHQ9
SUM OF ALL RESPONSES TO PHQ9 QUESTIONS 1 & 2: 0
SUM OF ALL RESPONSES TO PHQ QUESTIONS 1-9: 0
SUM OF ALL RESPONSES TO PHQ QUESTIONS 1-9: 0
2. FEELING DOWN, DEPRESSED OR HOPELESS: 0
SUM OF ALL RESPONSES TO PHQ QUESTIONS 1-9: 0
SUM OF ALL RESPONSES TO PHQ QUESTIONS 1-9: 0
1. LITTLE INTEREST OR PLEASURE IN DOING THINGS: 0

## 2024-03-13 NOTE — PROGRESS NOTES
\"Have you been to the ER, urgent care clinic since your last visit?  Hospitalized since your last visit?\"    NO    “Have you seen or consulted any other health care providers outside of [facility] since your last visit?”    NO            Click Here for Release of Records Request

## 2024-03-13 NOTE — PROGRESS NOTES
Janice Espinal is a 89 y.o. female presents for    Chief Complaint   Patient presents with    Dizziness     Sometimes can help but to go to sleep. And then if she sleeps throughout the day she can't sleep at night. Has been dizzy, bad headaches, wears compression socks but now feels like legs/feet are cold like icesicles. Stay sore now. Can't keep eyes shut. Take advil occasionally. Stopped taking fluid pills but didn't help.     .    ASSESSMENT and PLAN  Janice was seen today for dizziness.    Diagnoses and all orders for this visit:    Acute cystitis without hematuria  Comments:  tx with abx and send for culture.  Orders:  -     Culture, Urine  -     cephALEXin (KEFLEX) 500 MG capsule; Take 1 capsule by mouth 2 times daily for 7 days    Mid back pain  Comments:  likely s/t her UTI. Pt will follow up in clinic if symptoms do not improve after completing antibiotics or worsen.  Orders:  -     AMB POC URINALYSIS DIP STICK AUTO W/ MICRO    Fatigue, unspecified type  Comments:  likely s/t infection. If symptoms persist once she completes antibiotics, she will let us know.    Acquired hypothyroidism  Comments:  recheck TSH  Orders:  -     TSH + Free T4 Panel             HISTORY OF PRESENT ILLNESS  Janice Espinal is a 89 y.o. female presents for    Chief Complaint   Patient presents with    Dizziness     Sometimes can help but to go to sleep. And then if she sleeps throughout the day she can't sleep at night. Has been dizzy, bad headaches, wears compression socks but now feels like legs/feet are cold like icesicles. Stay sore now. Can't keep eyes shut. Take advil occasionally. Stopped taking fluid pills but didn't help.     .    Patient reports feeling \"lousy\" for the last 2-3 days. She states she has been sleeping more, feels she is in and out sleep all day. Also has been feeling really cold. She reports runny nose about 2 weeks ago which lasted about 3 days. She denies any cold symptoms this week.  She reports

## 2024-03-14 LAB
T4 FREE SERPL-MCNC: 1.17 NG/DL (ref 0.82–1.77)
TSH SERPL DL<=0.005 MIU/L-ACNC: 2.56 UIU/ML (ref 0.45–4.5)

## 2024-03-16 LAB — BACTERIA UR CULT: ABNORMAL

## 2024-03-25 RX ORDER — FUROSEMIDE 20 MG/1
TABLET ORAL
Qty: 60 TABLET | Refills: 2 | Status: SHIPPED | OUTPATIENT
Start: 2024-03-25

## 2024-06-13 ENCOUNTER — OFFICE VISIT (OUTPATIENT)
Dept: PRIMARY CARE CLINIC | Facility: CLINIC | Age: 89
End: 2024-06-13
Payer: MEDICARE

## 2024-06-13 VITALS
HEART RATE: 59 BPM | DIASTOLIC BLOOD PRESSURE: 45 MMHG | HEIGHT: 61 IN | WEIGHT: 149.6 LBS | SYSTOLIC BLOOD PRESSURE: 122 MMHG | BODY MASS INDEX: 28.25 KG/M2 | TEMPERATURE: 97.7 F

## 2024-06-13 DIAGNOSIS — L98.9 SKIN PROBLEM: ICD-10-CM

## 2024-06-13 DIAGNOSIS — R35.0 URINARY FREQUENCY: Primary | ICD-10-CM

## 2024-06-13 PROCEDURE — 99213 OFFICE O/P EST LOW 20 MIN: CPT | Performed by: NURSE PRACTITIONER

## 2024-06-13 PROCEDURE — 1123F ACP DISCUSS/DSCN MKR DOCD: CPT | Performed by: NURSE PRACTITIONER

## 2024-06-13 RX ORDER — CIPROFLOXACIN 500 MG/1
500 TABLET, FILM COATED ORAL 2 TIMES DAILY
Qty: 10 TABLET | Refills: 0 | Status: SHIPPED | OUTPATIENT
Start: 2024-06-13 | End: 2024-06-18

## 2024-06-13 ASSESSMENT — ENCOUNTER SYMPTOMS
COLOR CHANGE: 1
ABDOMINAL PAIN: 0
NAUSEA: 0
BACK PAIN: 1
VOMITING: 0

## 2024-06-13 NOTE — PROGRESS NOTES
\"Have you been to the ER, urgent care clinic since your last visit?  Hospitalized since your last visit?\"    NO    “Have you seen or consulted any other health care providers outside of [facility] since your last visit?”    NO            Click Here for Release of Records Request

## 2024-06-13 NOTE — PROGRESS NOTES
Janice Espinal is a 89 y.o. female presents for    Chief Complaint   Patient presents with    Urinary Tract Infection     Could be possible UTI same symptoms as last visit. Was given antibiotic seem to get cleared up but it started to come back a couple days ago     .    ASSESSMENT and PLAN  Janice was seen today for urinary tract infection.    Diagnoses and all orders for this visit:    Urinary frequency  Comments:  UA looks ok today but given symptoms will treat with cipro and send for culture. will stop abx if culture neg. Red flag sx reviewed.  Orders:  -     Culture, Urine  -     Urinalysis with Microscopic  -     ciprofloxacin (CIPRO) 500 MG tablet; Take 1 tablet by mouth 2 times daily for 5 days    Skin problem  Comments:  small wound to left shin seems to be healing well, no signs of infection or cellulitis. Reviewed signs to observe for possible infection/cellulitis and when to follow up.        HISTORY OF PRESENT ILLNESS  Janice Espinal is a 89 y.o. female presents for    Chief Complaint   Patient presents with    Urinary Tract Infection     Could be possible UTI same symptoms as last visit. Was given antibiotic seem to get cleared up but it started to come back a couple days ago     .    Patient reports low back pain, \"sometimes it feels like a knife going in it\", she was having the same symptoms in March when she was seen and had a UTI so she is worried she has a UTI. She reports urinary frequency. She denies burning when she urinates. She denies fevers, abdominal pain, nausea or vomiting.     She also dropped a block on her left lower leg, she has a small wound to left shin that she has been keeping clean and covered.    Vitals:    06/13/24 0955   BP: (!) 122/45   Site: Right Upper Arm   Position: Sitting   Cuff Size: Medium Adult   Pulse: 59   Temp: 97.7 °F (36.5 °C)   TempSrc: Oral   Weight: 67.9 kg (149 lb 9.6 oz)   Height: 1.549 m (5' 1\")     Patient Active Problem List   Diagnosis    AV

## 2024-06-15 LAB — BACTERIA UR CULT: ABNORMAL

## 2024-06-19 LAB — BACTERIA UR CULT: ABNORMAL

## 2024-07-29 RX ORDER — LEVOTHYROXINE SODIUM 88 UG/1
TABLET ORAL
Qty: 90 TABLET | Refills: 3 | Status: SHIPPED | OUTPATIENT
Start: 2024-07-29

## 2024-10-14 ENCOUNTER — OFFICE VISIT (OUTPATIENT)
Dept: PRIMARY CARE CLINIC | Facility: CLINIC | Age: 89
End: 2024-10-14
Payer: MEDICARE

## 2024-10-14 VITALS
TEMPERATURE: 98.3 F | HEART RATE: 77 BPM | BODY MASS INDEX: 27.38 KG/M2 | RESPIRATION RATE: 18 BRPM | WEIGHT: 145 LBS | OXYGEN SATURATION: 97 % | SYSTOLIC BLOOD PRESSURE: 121 MMHG | DIASTOLIC BLOOD PRESSURE: 50 MMHG | HEIGHT: 61 IN

## 2024-10-14 DIAGNOSIS — F51.01 PRIMARY INSOMNIA: ICD-10-CM

## 2024-10-14 DIAGNOSIS — R73.09 ELEVATED GLUCOSE: ICD-10-CM

## 2024-10-14 DIAGNOSIS — G89.29 OTHER CHRONIC PAIN: ICD-10-CM

## 2024-10-14 DIAGNOSIS — M54.50 CHRONIC RIGHT-SIDED LOW BACK PAIN WITHOUT SCIATICA: ICD-10-CM

## 2024-10-14 DIAGNOSIS — E03.9 ACQUIRED HYPOTHYROIDISM: Chronic | ICD-10-CM

## 2024-10-14 DIAGNOSIS — E53.8 B12 DEFICIENCY: ICD-10-CM

## 2024-10-14 DIAGNOSIS — D50.8 IRON DEFICIENCY ANEMIA SECONDARY TO INADEQUATE DIETARY IRON INTAKE: ICD-10-CM

## 2024-10-14 DIAGNOSIS — G89.29 CHRONIC RIGHT-SIDED LOW BACK PAIN WITHOUT SCIATICA: ICD-10-CM

## 2024-10-14 DIAGNOSIS — R20.0 NUMBNESS IN BOTH LEGS: ICD-10-CM

## 2024-10-14 DIAGNOSIS — R09.82 POST-NASAL DRAINAGE: ICD-10-CM

## 2024-10-14 DIAGNOSIS — Z00.00 MEDICARE ANNUAL WELLNESS VISIT, SUBSEQUENT: Primary | ICD-10-CM

## 2024-10-14 DIAGNOSIS — R60.0 EDEMA OF LOWER EXTREMITY: ICD-10-CM

## 2024-10-14 DIAGNOSIS — Z23 IMMUNIZATION DUE: ICD-10-CM

## 2024-10-14 DIAGNOSIS — N18.32 CHRONIC KIDNEY DISEASE, STAGE 3B (HCC): ICD-10-CM

## 2024-10-14 PROCEDURE — G0008 ADMIN INFLUENZA VIRUS VAC: HCPCS | Performed by: NURSE PRACTITIONER

## 2024-10-14 PROCEDURE — 99214 OFFICE O/P EST MOD 30 MIN: CPT | Performed by: NURSE PRACTITIONER

## 2024-10-14 PROCEDURE — 90653 IIV ADJUVANT VACCINE IM: CPT | Performed by: NURSE PRACTITIONER

## 2024-10-14 PROCEDURE — G0439 PPPS, SUBSEQ VISIT: HCPCS | Performed by: NURSE PRACTITIONER

## 2024-10-14 PROCEDURE — 1123F ACP DISCUSS/DSCN MKR DOCD: CPT | Performed by: NURSE PRACTITIONER

## 2024-10-14 RX ORDER — LEVOTHYROXINE SODIUM 88 UG/1
88 TABLET ORAL DAILY
Qty: 90 TABLET | Refills: 3 | Status: SHIPPED | OUTPATIENT
Start: 2024-10-14

## 2024-10-14 RX ORDER — FUROSEMIDE 20 MG
20 TABLET ORAL DAILY PRN
Qty: 90 TABLET | Refills: 2 | Status: SHIPPED | OUTPATIENT
Start: 2024-10-14

## 2024-10-14 SDOH — ECONOMIC STABILITY: FOOD INSECURITY: WITHIN THE PAST 12 MONTHS, YOU WORRIED THAT YOUR FOOD WOULD RUN OUT BEFORE YOU GOT MONEY TO BUY MORE.: NEVER TRUE

## 2024-10-14 SDOH — ECONOMIC STABILITY: INCOME INSECURITY: HOW HARD IS IT FOR YOU TO PAY FOR THE VERY BASICS LIKE FOOD, HOUSING, MEDICAL CARE, AND HEATING?: NOT HARD AT ALL

## 2024-10-14 SDOH — ECONOMIC STABILITY: FOOD INSECURITY: WITHIN THE PAST 12 MONTHS, THE FOOD YOU BOUGHT JUST DIDN'T LAST AND YOU DIDN'T HAVE MONEY TO GET MORE.: NEVER TRUE

## 2024-10-14 ASSESSMENT — PATIENT HEALTH QUESTIONNAIRE - PHQ9
SUM OF ALL RESPONSES TO PHQ QUESTIONS 1-9: 0
SUM OF ALL RESPONSES TO PHQ QUESTIONS 1-9: 0
SUM OF ALL RESPONSES TO PHQ9 QUESTIONS 1 & 2: 0
SUM OF ALL RESPONSES TO PHQ QUESTIONS 1-9: 0
2. FEELING DOWN, DEPRESSED OR HOPELESS: NOT AT ALL
SUM OF ALL RESPONSES TO PHQ QUESTIONS 1-9: 0
1. LITTLE INTEREST OR PLEASURE IN DOING THINGS: NOT AT ALL

## 2024-10-14 ASSESSMENT — LIFESTYLE VARIABLES: HOW OFTEN DO YOU HAVE A DRINK CONTAINING ALCOHOL: NEVER

## 2024-10-14 NOTE — PATIENT INSTRUCTIONS
cook and eat. This helps lower your blood pressure. Taste food before salting. Add only a little salt when you think you need it. With time, your taste buds will adjust to less salt.     Eat fewer snack items, fast foods, canned soups, and other high-salt, high-fat, processed foods.     Read food labels and try to avoid saturated and trans fats. They increase your risk of heart disease by raising cholesterol levels.     Limit the amount of solid fat--butter, margarine, and shortening--you eat. Use olive, peanut, or canola oil when you cook. Bake, broil, and steam foods instead of frying them.     Eat a variety of fruit and vegetables every day. Dark green, deep orange, red, or yellow fruits and vegetables are especially good for you. Examples include spinach, carrots, peaches, and berries.     Foods high in fiber can reduce your cholesterol and provide important vitamins and minerals. High-fiber foods include whole-grain cereals and breads, oatmeal, beans, brown rice, citrus fruits, and apples.     Eat lean proteins. Heart-healthy proteins include seafood, lean meats and poultry, eggs, beans, peas, nuts, seeds, and soy products.     Limit drinks and foods with added sugar. These include candy, desserts, and soda pop.   Heart-healthy lifestyle    If your doctor recommends it, get more exercise. For many people, walking is a good choice. Or you may want to swim, bike, or do other activities. Bit by bit, increase the time you're active every day. Try for at least 30 minutes on most days of the week.     Try to quit or cut back on using tobacco and other nicotine products. This includes smoking and vaping. If you need help quitting, talk to your doctor about stop-smoking programs and medicines. These can increase your chances of quitting for good. Quitting is one of the most important things you can do to protect your heart. It is never too late to quit. Try to avoid secondhand smoke too.     Stay at a weight that's

## 2024-10-14 NOTE — PROGRESS NOTES
Chief Complaint   Patient presents with    Medicare AWV     BP (!) 121/50 (Site: Left Upper Arm, Position: Sitting)   Pulse 77   Temp 98.3 °F (36.8 °C) (Oral)   Resp 18   Ht 1.549 m (5' 1\")   Wt 65.8 kg (145 lb)   LMP  (LMP Unknown)   SpO2 97%   BMI 27.40 kg/m²     \"Have you been to the ER, urgent care clinic since your last visit?  Hospitalized since your last visit?\"    NO    “Have you seen or consulted any other health care providers outside our system since your last visit?”    NO             
  Pulse: 77   Resp: 18   Temp: 98.3 °F (36.8 °C)   TempSrc: Oral   SpO2: 97%   Weight: 65.8 kg (145 lb)   Height: 1.549 m (5' 1\")      Body mass index is 27.4 kg/m².        Physical Exam  Constitutional:       Appearance: Normal appearance. She is obese.      Comments: Using walker     HENT:      Head: Normocephalic.   Eyes:      Conjunctiva/sclera: Conjunctivae normal.   Cardiovascular:      Rate and Rhythm: Normal rate and regular rhythm.      Pulses: Normal pulses.      Heart sounds: Normal heart sounds.   Pulmonary:      Effort: Pulmonary effort is normal.      Breath sounds: Normal breath sounds.   Musculoskeletal:      Cervical back: Normal range of motion.   Skin:     General: Skin is warm and dry.   Neurological:      Mental Status: She is alert and oriented to person, place, and time.   Psychiatric:         Mood and Affect: Mood normal.         Behavior: Behavior normal.               No Known Allergies  Prior to Visit Medications    Medication Sig Taking? Authorizing Provider   levothyroxine (SYNTHROID) 88 MCG tablet Take 1 tablet by mouth daily Yes Liyah Ernst APRN - NP   furosemide (LASIX) 20 MG tablet Take 1 tablet by mouth daily as needed (leg swelling) Yes Liyah Ernst APRN - NP   diclofenac sodium (VOLTAREN) 1 % GEL Apply 4 g topically 4 times daily Yes Liyah Ernst APRN - NP   ramipril (ALTACE) 2.5 MG capsule Take 1 capsule by mouth daily Yes Automatic Reconciliation, Ar   triamcinolone (KENALOG) 0.1 % ointment Apply topically 2 times daily  Patient not taking: Reported on 10/14/2024  Automatic Reconciliation, Ar       CareTeam (Including outside providers/suppliers regularly involved in providing care):   Patient Care Team:  Liyah Ernst APRN - NP as PCP - General  Liyah Ernst APRN - NP as PCP - Empaneled Provider  Kike Bar MD as Physician  Therese Bass MD as Physician      Reviewed and updated this visit:  Tobacco  Allergies  Meds  Problems  Med Hx  Surg Hx  Soc

## 2024-10-15 LAB
25(OH)D3+25(OH)D2 SERPL-MCNC: 44.5 NG/ML (ref 30–100)
ALBUMIN SERPL-MCNC: 4.2 G/DL (ref 3.6–4.6)
ALP SERPL-CCNC: 71 IU/L (ref 44–121)
ALT SERPL-CCNC: 8 IU/L (ref 0–32)
AST SERPL-CCNC: 13 IU/L (ref 0–40)
BILIRUB SERPL-MCNC: 0.4 MG/DL (ref 0–1.2)
BUN SERPL-MCNC: 39 MG/DL (ref 10–36)
BUN/CREAT SERPL: 22 (ref 12–28)
CALCIUM SERPL-MCNC: 9.4 MG/DL (ref 8.7–10.3)
CHLORIDE SERPL-SCNC: 104 MMOL/L (ref 96–106)
CO2 SERPL-SCNC: 20 MMOL/L (ref 20–29)
CREAT SERPL-MCNC: 1.75 MG/DL (ref 0.57–1)
EGFRCR SERPLBLD CKD-EPI 2021: 27 ML/MIN/1.73
ERYTHROCYTE [DISTWIDTH] IN BLOOD BY AUTOMATED COUNT: 13.1 % (ref 11.7–15.4)
GLOBULIN SER CALC-MCNC: 2.8 G/DL (ref 1.5–4.5)
GLUCOSE SERPL-MCNC: 101 MG/DL (ref 70–99)
HBA1C MFR BLD: 5.6 % (ref 4.8–5.6)
HCT VFR BLD AUTO: 33.6 % (ref 34–46.6)
HGB BLD-MCNC: 10.6 G/DL (ref 11.1–15.9)
IRON SATN MFR SERPL: 35 % (ref 15–55)
IRON SERPL-MCNC: 91 UG/DL (ref 27–139)
MCH RBC QN AUTO: 29.4 PG (ref 26.6–33)
MCHC RBC AUTO-ENTMCNC: 31.5 G/DL (ref 31.5–35.7)
MCV RBC AUTO: 93 FL (ref 79–97)
PLATELET # BLD AUTO: 228 X10E3/UL (ref 150–450)
POTASSIUM SERPL-SCNC: 4.7 MMOL/L (ref 3.5–5.2)
PROT SERPL-MCNC: 7 G/DL (ref 6–8.5)
RBC # BLD AUTO: 3.61 X10E6/UL (ref 3.77–5.28)
SODIUM SERPL-SCNC: 140 MMOL/L (ref 134–144)
T4 FREE SERPL-MCNC: 1.17 NG/DL (ref 0.82–1.77)
TIBC SERPL-MCNC: 259 UG/DL (ref 250–450)
TSH SERPL DL<=0.005 MIU/L-ACNC: 0.74 UIU/ML (ref 0.45–4.5)
UIBC SERPL-MCNC: 168 UG/DL (ref 118–369)
VIT B12 SERPL-MCNC: 211 PG/ML (ref 232–1245)
WBC # BLD AUTO: 4.6 X10E3/UL (ref 3.4–10.8)

## 2024-10-15 RX ORDER — UREA 10 %
500 LOTION (ML) TOPICAL DAILY
Qty: 90 TABLET | Refills: 1 | Status: SHIPPED | OUTPATIENT
Start: 2024-10-15 | End: 2025-10-15

## 2024-12-24 ENCOUNTER — TELEPHONE (OUTPATIENT)
Dept: PHARMACY | Facility: CLINIC | Age: 88
End: 2024-12-24

## 2024-12-24 NOTE — TELEPHONE ENCOUNTER
[facility] CLINICAL PHARMACY: ADHERENCE REVIEW  [facility] [employer] [facility]    Last Visit:  10/14/24  Next Visit: NA    Patient also appears to be prescribed:No Others in the metric at this time  Medicare Advantage - MRMA  ACO      ASSESSMENT  ACE/ARB ADHERENCE    Insurance Records claims through 24  (Prior Year PDC = 80% - PASSED ; YTD PDC = 82%; Potential Fail Date: 2024):     RAMIPRIL 2.5 MG last filled on 2024 for 90 / 90 day supply. Next refill due: 10/27/2024 MAX 2024    Prescriber: LULA WOLF   [] [facility] [x] Outside Provider     Per [facility]:  0 refills remaining.    Lula Wolf MD   [address] [address]   Phone: [phone]   Fax: [phone]   [] Crital fill [x] CURRENT refill [] FUTURE refill [] RX CHANGE    BP Readings from Last 3 Encounters:   10/14/24 (!) 121/50   24 (!) 122/45   24 (!) 138/58      Lab Results   Component Value Date/Time    CREATININE 1.75 10/14/2024 02:54 PM    LABGLOM 27 10/14/2024 02:54 PM    LABGLOM 28 2023 11:11 AM    K 4.7 10/14/2024 02:54 PM       PLAN  The following are interventions that have been identified:   Patient OVERDUE refilling RAMIPRIL 2.5 MG and active on home medication list.   Patient needs current refills for RAMIPRIL 2.5 MG.   Outreach:  Provider:  Faxing a refill request to an Outside Provider for RAMIPRIL 2.5 MG to be sent to [facility].    Fax request to Lula Wolf 2024  RAMIPRIL 2.5MG CAPSULES  TAKE ONE CAPSULE BY MOUTH DAILY   Dispensed: 2024 12:00 AM   Days supply:/ QTY 90/ 90   Refills remainin   Pharmacy: [facility] [address] [place] [address] [address] - P [phone] - F [phone]   Authorizing provider: Lula Wolf MD  [address] [address]  Phone: [phone]     Fax: [phone]     New rx sent and filled  for 30 days    Kay Barnard, RaulD,

## 2025-03-12 ENCOUNTER — OFFICE VISIT (OUTPATIENT)
Dept: PRIMARY CARE CLINIC | Facility: CLINIC | Age: 89
End: 2025-03-12

## 2025-03-12 VITALS
SYSTOLIC BLOOD PRESSURE: 132 MMHG | DIASTOLIC BLOOD PRESSURE: 48 MMHG | HEIGHT: 60 IN | TEMPERATURE: 98.3 F | HEART RATE: 66 BPM | RESPIRATION RATE: 16 BRPM | OXYGEN SATURATION: 98 % | WEIGHT: 155 LBS | BODY MASS INDEX: 30.43 KG/M2

## 2025-03-12 DIAGNOSIS — K64.9 HEMORRHOIDS, UNSPECIFIED HEMORRHOID TYPE: ICD-10-CM

## 2025-03-12 DIAGNOSIS — L23.9 ALLERGIC DERMATITIS: ICD-10-CM

## 2025-03-12 DIAGNOSIS — Z00.00 MEDICARE ANNUAL WELLNESS VISIT, SUBSEQUENT: Primary | ICD-10-CM

## 2025-03-12 DIAGNOSIS — N18.32 CHRONIC KIDNEY DISEASE, STAGE 3B (HCC): ICD-10-CM

## 2025-03-12 RX ORDER — HYDROXYZINE HYDROCHLORIDE 25 MG/1
25 TABLET, FILM COATED ORAL EVERY 8 HOURS PRN
Qty: 30 TABLET | Refills: 0 | Status: SHIPPED | OUTPATIENT
Start: 2025-03-12 | End: 2025-03-22

## 2025-03-12 RX ORDER — HYDROCORTISONE 25 MG/G
CREAM TOPICAL
Qty: 28 G | Refills: 0 | Status: SHIPPED | OUTPATIENT
Start: 2025-03-12

## 2025-03-12 RX ORDER — PREDNISONE 20 MG/1
TABLET ORAL
Qty: 9 TABLET | Refills: 0 | Status: SHIPPED | OUTPATIENT
Start: 2025-03-12 | End: 2025-03-18

## 2025-03-12 RX ORDER — LORATADINE 10 MG/1
10 CAPSULE, LIQUID FILLED ORAL DAILY
COMMUNITY

## 2025-03-12 SDOH — ECONOMIC STABILITY: FOOD INSECURITY: WITHIN THE PAST 12 MONTHS, THE FOOD YOU BOUGHT JUST DIDN'T LAST AND YOU DIDN'T HAVE MONEY TO GET MORE.: NEVER TRUE

## 2025-03-12 SDOH — ECONOMIC STABILITY: FOOD INSECURITY: WITHIN THE PAST 12 MONTHS, YOU WORRIED THAT YOUR FOOD WOULD RUN OUT BEFORE YOU GOT MONEY TO BUY MORE.: NEVER TRUE

## 2025-03-12 ASSESSMENT — ANXIETY QUESTIONNAIRES
4. TROUBLE RELAXING: NOT AT ALL
7. FEELING AFRAID AS IF SOMETHING AWFUL MIGHT HAPPEN: NOT AT ALL
3. WORRYING TOO MUCH ABOUT DIFFERENT THINGS: NOT AT ALL
5. BEING SO RESTLESS THAT IT IS HARD TO SIT STILL: NOT AT ALL
2. NOT BEING ABLE TO STOP OR CONTROL WORRYING: NOT AT ALL
6. BECOMING EASILY ANNOYED OR IRRITABLE: NOT AT ALL
1. FEELING NERVOUS, ANXIOUS, OR ON EDGE: NOT AT ALL
GAD7 TOTAL SCORE: 0
IF YOU CHECKED OFF ANY PROBLEMS ON THIS QUESTIONNAIRE, HOW DIFFICULT HAVE THESE PROBLEMS MADE IT FOR YOU TO DO YOUR WORK, TAKE CARE OF THINGS AT HOME, OR GET ALONG WITH OTHER PEOPLE: NOT DIFFICULT AT ALL

## 2025-03-12 ASSESSMENT — PATIENT HEALTH QUESTIONNAIRE - PHQ9
SUM OF ALL RESPONSES TO PHQ QUESTIONS 1-9: 0
2. FEELING DOWN, DEPRESSED OR HOPELESS: NOT AT ALL
1. LITTLE INTEREST OR PLEASURE IN DOING THINGS: NOT AT ALL
SUM OF ALL RESPONSES TO PHQ QUESTIONS 1-9: 0

## 2025-03-12 NOTE — PROGRESS NOTES
Chief Complaint   Patient presents with    Medicare AWV    Rash     BP (!) 132/48 (BP Site: Left Upper Arm, Patient Position: Sitting)   Pulse 66   Temp 98.3 °F (36.8 °C) (Oral)   Resp 16   Ht 1.524 m (5')   Wt 70.3 kg (155 lb)   LMP  (LMP Unknown)   SpO2 98%   BMI 30.27 kg/m²     \"Have you been to the ER, urgent care clinic since your last visit?  Hospitalized since your last visit?\"    NO    “Have you seen or consulted any other health care providers outside our system since your last visit?”    NO             
  vitamin B-12 (CYANOCOBALAMIN) 500 MCG tablet Take 1 tablet by mouth daily Yes Liyah Ernst APRN - NP   levothyroxine (SYNTHROID) 88 MCG tablet Take 1 tablet by mouth daily Yes Liyah Ernst APRN - NP   furosemide (LASIX) 20 MG tablet Take 1 tablet by mouth daily as needed (leg swelling) Yes Liyah Ernst APRN - NP   diclofenac sodium (VOLTAREN) 1 % GEL Apply 4 g topically 4 times daily Yes Liyah Ernst APRN - NP       CareTeam (Including outside providers/suppliers regularly involved in providing care):   Patient Care Team:  Liyah Ernst APRN - NP as PCP - General  Liyah Ernst APRN - NP as PCP - Empaneled Provider  Kike Bar MD as Physician  Tehrese Bass MD as Physician     Recommendations for Preventive Services Due: see orders and patient instructions/AVS.  Recommended screening schedule for the next 5-10 years is provided to the patient in written form: see Patient Instructions/AVS.     Reviewed and updated this visit:  Tobacco  Allergies  Meds  Med Hx  Surg Hx  Fam Hx  Sexual Hx

## 2025-03-26 ENCOUNTER — OFFICE VISIT (OUTPATIENT)
Dept: PRIMARY CARE CLINIC | Facility: CLINIC | Age: 89
End: 2025-03-26
Payer: MEDICARE

## 2025-03-26 VITALS — BODY MASS INDEX: 29.29 KG/M2 | RESPIRATION RATE: 18 BRPM | HEIGHT: 61 IN

## 2025-03-26 DIAGNOSIS — I87.2 VENOUS STASIS DERMATITIS: Primary | ICD-10-CM

## 2025-03-26 DIAGNOSIS — G62.9 NEUROPATHY: ICD-10-CM

## 2025-03-26 PROCEDURE — 99214 OFFICE O/P EST MOD 30 MIN: CPT | Performed by: NURSE PRACTITIONER

## 2025-03-26 PROCEDURE — 1123F ACP DISCUSS/DSCN MKR DOCD: CPT | Performed by: NURSE PRACTITIONER

## 2025-03-26 PROCEDURE — 1159F MED LIST DOCD IN RCRD: CPT | Performed by: NURSE PRACTITIONER

## 2025-03-26 PROCEDURE — 1160F RVW MEDS BY RX/DR IN RCRD: CPT | Performed by: NURSE PRACTITIONER

## 2025-03-26 RX ORDER — GABAPENTIN 100 MG/1
100 CAPSULE ORAL NIGHTLY
Qty: 30 CAPSULE | Refills: 2 | Status: SHIPPED | OUTPATIENT
Start: 2025-03-26 | End: 2025-06-24

## 2025-03-26 RX ORDER — CLOBETASOL PROPIONATE 0.5 MG/G
CREAM TOPICAL
Qty: 45 G | Refills: 2 | Status: SHIPPED | OUTPATIENT
Start: 2025-03-26

## 2025-03-26 NOTE — PROGRESS NOTES
[facility] 89 [facility] who was seen in clinic today (3/26/2025).    Assessment & Plan:   Below is the assessment and plan developed based on review of pertinent history, physical exam, labs, studies, and medications.    1. Venous stasis dermatitis  Comments:  recommended propping legs up, wearing compression stocking.  she declines increase in lasix.  clobestasol to use to rash  Orders:  -     clobetasol (TEMOVATE) 0.05 % cream; Apply topically 2 times daily., Disp-45 g, R-2, Normal  2. Neuropathy  Comments:  to feet.   low concern for vascular issue given normal temp, pulses, color to feet.   Will refer to poditary to evaluate.  Gabapentin to use at bedtime  Orders:  -     gabapentin (NEURONTIN) 100 MG capsule; Take 1 capsule by mouth nightly for 90 days. Intended supply: 30 days Max Daily Amount: 100 mg, Disp-30 capsule, R-2Normal  -     External Referral To Podiatry      No follow-ups on file.    Subjective:   Janice was seen today for Numbness     Patient reported that she is continuing to have leg swelling and dermatitis rash is returning after improving with steroids from previous visit on 3/12.   She has been sitting with her legs propped up for 3 hours a day and wearing compression stockings intermittently but admits they make her itch.  She uses lasix once a day but declines an increase in lasix due to high urinary output.    She reported she is concerned by her feet feeling like they are in a bucket of ice however they are warm to the touch.  This is keeping her awake at night, she is only sleeping a few hours at  time.       Review of Systems   Cardiovascular:  Positive for leg swelling.   Skin:  Positive for rash.   Neurological:  Positive for numbness.          Objective:     Vitals:    03/26/25 1255   Resp: 18   TempSrc: Oral   Height: 1.549 m (5' 1\")      Body mass index is 29.29 kg/m².     Physical Exam  Constitutional:       Appearance: Normal appearance.      Comments: Using a

## 2025-07-09 DIAGNOSIS — R60.0 EDEMA OF LOWER EXTREMITY: ICD-10-CM

## 2025-07-09 RX ORDER — FUROSEMIDE 20 MG/1
TABLET ORAL
Qty: 90 TABLET | Refills: 2 | Status: SHIPPED | OUTPATIENT
Start: 2025-07-09

## 2025-07-17 ENCOUNTER — TELEPHONE (OUTPATIENT)
Dept: PRIMARY CARE CLINIC | Facility: CLINIC | Age: 89
End: 2025-07-17

## 2025-07-17 DIAGNOSIS — G62.9 NEUROPATHY: ICD-10-CM

## 2025-07-17 RX ORDER — GABAPENTIN 100 MG/1
100 CAPSULE ORAL NIGHTLY
Qty: 30 CAPSULE | Refills: 2 | Status: SHIPPED | OUTPATIENT
Start: 2025-07-17 | End: 2025-10-15

## 2025-07-17 NOTE — TELEPHONE ENCOUNTER
Janice Espinal is requesting a refill on gabapentin        Next visit is scheduled for Visit date not found   Last visit : 3/26/2025     Send to :   [id number] - [place] - [address] [phone] - F [phone]  [address] [address]  Phone: [phone] Fax: [phone]

## 2025-08-27 ENCOUNTER — TELEPHONE (OUTPATIENT)
Dept: PRIMARY CARE CLINIC | Facility: CLINIC | Age: 89
End: 2025-08-27

## 2025-09-03 ENCOUNTER — OFFICE VISIT (OUTPATIENT)
Dept: PRIMARY CARE CLINIC | Facility: CLINIC | Age: 89
End: 2025-09-03

## 2025-09-03 VITALS
BODY MASS INDEX: 32.66 KG/M2 | RESPIRATION RATE: 16 BRPM | OXYGEN SATURATION: 94 % | DIASTOLIC BLOOD PRESSURE: 62 MMHG | WEIGHT: 173 LBS | HEIGHT: 61 IN | TEMPERATURE: 98 F | SYSTOLIC BLOOD PRESSURE: 134 MMHG | HEART RATE: 58 BPM

## 2025-09-03 DIAGNOSIS — Z09 HOSPITAL DISCHARGE FOLLOW-UP: Primary | ICD-10-CM

## 2025-09-03 DIAGNOSIS — G62.9 NEUROPATHY: ICD-10-CM

## 2025-09-03 DIAGNOSIS — D63.1 ANEMIA DUE TO STAGE 3B CHRONIC KIDNEY DISEASE (HCC): ICD-10-CM

## 2025-09-03 DIAGNOSIS — N18.32 ANEMIA DUE TO STAGE 3B CHRONIC KIDNEY DISEASE (HCC): ICD-10-CM

## 2025-09-03 DIAGNOSIS — I87.2 VENOUS INSUFFICIENCY: ICD-10-CM

## 2025-09-03 RX ORDER — GABAPENTIN 100 MG/1
200 CAPSULE ORAL NIGHTLY
Qty: 60 CAPSULE | Refills: 2 | Status: SHIPPED | OUTPATIENT
Start: 2025-09-03 | End: 2025-12-02

## 2025-09-04 LAB
ALBUMIN SERPL-MCNC: 3.8 G/DL (ref 3.6–4.6)
ALP SERPL-CCNC: 87 IU/L (ref 44–121)
ALT SERPL-CCNC: 9 IU/L (ref 0–32)
AST SERPL-CCNC: 15 IU/L (ref 0–40)
BILIRUB SERPL-MCNC: <0.2 MG/DL (ref 0–1.2)
BUN SERPL-MCNC: 28 MG/DL (ref 10–36)
BUN/CREAT SERPL: 15 (ref 12–28)
CALCIUM SERPL-MCNC: 8.5 MG/DL (ref 8.7–10.3)
CHLORIDE SERPL-SCNC: 104 MMOL/L (ref 96–106)
CO2 SERPL-SCNC: 21 MMOL/L (ref 20–29)
CREAT SERPL-MCNC: 1.81 MG/DL (ref 0.57–1)
EGFRCR SERPLBLD CKD-EPI 2021: 26 ML/MIN/1.73
ERYTHROCYTE [DISTWIDTH] IN BLOOD BY AUTOMATED COUNT: 20.7 % (ref 11.7–15.4)
GLOBULIN SER CALC-MCNC: 2.8 G/DL (ref 1.5–4.5)
GLUCOSE SERPL-MCNC: 85 MG/DL (ref 70–99)
HCT VFR BLD AUTO: 29.1 % (ref 34–46.6)
HGB BLD-MCNC: 8.6 G/DL (ref 11.1–15.9)
MCH RBC QN AUTO: 26.1 PG (ref 26.6–33)
MCHC RBC AUTO-ENTMCNC: 29.6 G/DL (ref 31.5–35.7)
MCV RBC AUTO: 88 FL (ref 79–97)
PLATELET # BLD AUTO: 310 X10E3/UL (ref 150–450)
POTASSIUM SERPL-SCNC: 4.9 MMOL/L (ref 3.5–5.2)
PROT SERPL-MCNC: 6.6 G/DL (ref 6–8.5)
RBC # BLD AUTO: 3.29 X10E6/UL (ref 3.77–5.28)
SODIUM SERPL-SCNC: 140 MMOL/L (ref 134–144)
WBC # BLD AUTO: 5.7 X10E3/UL (ref 3.4–10.8)